# Patient Record
Sex: FEMALE | Race: WHITE | NOT HISPANIC OR LATINO | Employment: UNEMPLOYED | URBAN - NONMETROPOLITAN AREA
[De-identification: names, ages, dates, MRNs, and addresses within clinical notes are randomized per-mention and may not be internally consistent; named-entity substitution may affect disease eponyms.]

---

## 2017-08-29 ENCOUNTER — CONSULT (OUTPATIENT)
Dept: ONCOLOGY | Facility: CLINIC | Age: 37
End: 2017-08-29

## 2017-08-29 VITALS
RESPIRATION RATE: 18 BRPM | OXYGEN SATURATION: 98 % | TEMPERATURE: 98.5 F | SYSTOLIC BLOOD PRESSURE: 110 MMHG | HEIGHT: 61 IN | HEART RATE: 86 BPM | BODY MASS INDEX: 26.36 KG/M2 | WEIGHT: 139.6 LBS | DIASTOLIC BLOOD PRESSURE: 73 MMHG

## 2017-08-29 DIAGNOSIS — D75.1 POLYCYTHEMIA: Primary | ICD-10-CM

## 2017-08-29 LAB
BASOPHILS # BLD AUTO: 0.03 10*3/MM3 (ref 0–0.3)
BASOPHILS NFR BLD AUTO: 0.4 % (ref 0–2)
DEPRECATED RDW RBC AUTO: 48.2 FL (ref 37–54)
EOSINOPHIL # BLD AUTO: 0.11 10*3/MM3 (ref 0–0.7)
EOSINOPHIL NFR BLD AUTO: 1.6 % (ref 0–5)
ERYTHROCYTE [DISTWIDTH] IN BLOOD BY AUTOMATED COUNT: 14.7 % (ref 11.5–14.5)
HCT VFR BLD AUTO: 51.6 % (ref 37–47)
HGB BLD-MCNC: 17.6 G/DL (ref 12–16)
IMM GRANULOCYTES # BLD: 0.02 10*3/MM3 (ref 0–0.03)
IMM GRANULOCYTES NFR BLD: 0.3 % (ref 0–0.5)
LYMPHOCYTES # BLD AUTO: 1.91 10*3/MM3 (ref 1–3)
LYMPHOCYTES NFR BLD AUTO: 28.1 % (ref 21–51)
MCH RBC QN AUTO: 31.2 PG (ref 27–33)
MCHC RBC AUTO-ENTMCNC: 34.1 G/DL (ref 33–37)
MCV RBC AUTO: 91.5 FL (ref 80–94)
MONOCYTES # BLD AUTO: 0.43 10*3/MM3 (ref 0.1–0.9)
MONOCYTES NFR BLD AUTO: 6.3 % (ref 0–10)
NEUTROPHILS # BLD AUTO: 4.3 10*3/MM3 (ref 1.4–6.5)
NEUTROPHILS NFR BLD AUTO: 63.3 % (ref 30–70)
PLATELET # BLD AUTO: 272 10*3/MM3 (ref 130–400)
PMV BLD AUTO: 10 FL (ref 6–10)
RBC # BLD AUTO: 5.64 10*6/MM3 (ref 4.2–5.4)
WBC NRBC COR # BLD: 6.8 10*3/MM3 (ref 4.5–12.5)

## 2017-08-29 PROCEDURE — 81219 CALR GENE COM VARIANTS: CPT | Performed by: NURSE PRACTITIONER

## 2017-08-29 PROCEDURE — 81402 MOPATH PROCEDURE LEVEL 3: CPT | Performed by: NURSE PRACTITIONER

## 2017-08-29 PROCEDURE — 88185 FLOWCYTOMETRY/TC ADD-ON: CPT | Performed by: NURSE PRACTITIONER

## 2017-08-29 PROCEDURE — 85025 COMPLETE CBC W/AUTO DIFF WBC: CPT | Performed by: NURSE PRACTITIONER

## 2017-08-29 PROCEDURE — 99214 OFFICE O/P EST MOD 30 MIN: CPT | Performed by: NURSE PRACTITIONER

## 2017-08-29 PROCEDURE — 88184 FLOWCYTOMETRY/ TC 1 MARKER: CPT | Performed by: NURSE PRACTITIONER

## 2017-08-29 PROCEDURE — 88189 FLOWCYTOMETRY/READ 16 & >: CPT | Performed by: NURSE PRACTITIONER

## 2017-08-29 NOTE — PROGRESS NOTES
DATE:  8/29/2017    DIAGNOSIS: Polycythemia    CHIEF COMPLAINT:  Follow up of Polycythemia    HPI:  Ms. Glasgow is a very pleasant 35 year old  female who current presents in follow up appointment   at the request of ELSA Russell for further evaluation of possible polycythemia.She is unsure as to how long she has had an elevated hemoglobin and hematocrit. She denies of any fevers, night sweats or weight changes. She does complain of headaches that have been attributed to     migraines. She does states that she has early satiety and decreased appetite. Most recently she recently used a cream on her face for prevention of acne and has had erythema of her face along with a new nodule on her forehead that is tender to touch. No tongue swelling. Patient denies of any pruritus. She denies of any chest pain, does complain of shortness of air on exertion. Denies of any abdominal pain or recent changes to bowel movements or urination. No abnormal bleeding. Denies of any pruritus after     showers. She does report of snoring at night, but does not awake in the middle of the night.       Interval History:  Ms. Glasgow presents today for follow up of polycythemia. She was initially evaluated by Dr. Messina in February 2016 and underwent work up at that time. She followed up again in March 2016 and has been noncompliant with follow ups since then. Since her last visit, she reports she has been doing well. She has been following with her PCP with routine blood testing and continues to have chronic elevation in H/H. Initial workup from early last year was negative for JAK2 V167F mutation and LUCIO 2 Exon 12 mutation. Erythropoietin level was also normal. Ordered abdominal US and PFTs (given that she is a chronic smoker) but she did not have these done. CXR from January 2016 revealed no abnormalities. 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 Patient reports she continues to  smoke 2 PPD. She denies any headaches, dizziness or focal weakness. She denies any excessive fatigue. She denies any new medications added recently. She reports chronic SOB on exertion and cough, denies worsening. She reports good appetite and stable weight.  She reports she is anxious because she doesn't want to have cancer.     PAST MEDICAL HISTORY:  Arthritis, Hyperlipidemia, Congenital kidney malformation    PAST SURGICAL HISTORY:  Past Surgical History:   Procedure Laterality Date   • TUBAL ABDOMINAL LIGATION  2001       SOCIAL HISTORY:  Social History     Social History   • Marital status:      Spouse name: N/A   • Number of children: N/A   • Years of education: N/A     Occupational History   • Not on file.     Social History Main Topics   • Smoking status: Current Every Day Smoker     Packs/day: 2.00   • Smokeless tobacco: Not on file   • Alcohol use No   • Drug use: No   • Sexual activity: Defer     Other Topics Concern   • Not on file     Social History Narrative   • No narrative on file     FAMILY HISTORY:  History reviewed. No pertinent family history.    MEDICATIONS:    No current outpatient prescriptions on file.    ALLERGIES:  PCN, Latex     REVIEW OF SYSTEMS:    A comprehensive 14 point review of systems was performed.  Significant findings as mentioned above.  All other systems reviewed and are negative.      Physical Exam   Vital Signs:   Vitals:    08/29/17 1024   BP: 110/73   Pulse: 86   Resp: 18   Temp: 98.5 °F (36.9 °C)   SpO2: 98%     General: Well developed, well nourished, alert and oriented x 3, in no acute distress. Appears anxious  Head: ATNC   Eyes: PERRL, No evidence of conjunctivitis.   Nose: No nasal discharge.   Mouth: Oral mucosal membranes moist. No oral ulceration or hemorrhages.   Neck: Neck supple. No thyromegaly. No JVD.   Lungs: Scattered wheezing bilaterally, no rales or rhonchi.    Heart: S1, S2. Regular rate and rhythm. No murmurs, rubs, or gallops.   Abdomen: Soft.  Bowel sounds are normoactive. Nontender with palpation. No Hepatosplenomegaly can be appreciated.   Extremities: No cyanosis or edema. Peripheral pulses palpable and equal bilaterally.   Integumentary: No rash, sores, or erythema. No blistering, bruising, or dry skin.   Hem/Lymph Nodes: No palpable cervical, submandibular, supraclavicular or axillary lymphadenopathy noted. No petechiae, purpura or ecchymosis noted.   Neurologic: Alert, awake and oriented x 3. Grossly non-focal exam. No sensory deficit.     IMAGING:  CXR 1/2/17  The included view of the chest demonstrates the lungs to be well  aerated. There is no focal alveolar infiltrate or pleural effusion. The  cardiac silhouette is normal. No acute osseous abnormality is seen  within the chest.    RECENT LABS:  Lab Results   Component Value Date    WBC 6.80 08/29/2017    HGB 17.6 (H) 08/29/2017    HCT 51.6 (H) 08/29/2017    MCV 91.5 08/29/2017    RDW 14.7 (H) 08/29/2017     08/29/2017    NEUTRORELPCT 63.3 08/29/2017    LYMPHORELPCT 28.1 08/29/2017    MONORELPCT 6.3 08/29/2017    EOSRELPCT 1.6 08/29/2017    BASORELPCT 0.4 08/29/2017    NEUTROABS 4.30 08/29/2017    LYMPHSABS 1.91 08/29/2017       Lab Results   Component Value Date     02/05/2016    K 3.9 02/05/2016    CO2 28.5 02/05/2016     02/05/2016    BUN 9 02/05/2016    CREATININE 0.96 02/05/2016    GLUCOSE 94 02/05/2016    CALCIUM 9.1 02/05/2016    ALKPHOS 97 02/05/2016    AST 22 02/05/2016    ALT 22 02/05/2016    BILITOT 0.9 02/05/2016    ALBUMIN 4.3 02/05/2016    PROTEINTOT 7.4 02/05/2016 02/03/16: Erythropoietin: 8.1                        JAK2 V617F mutation: negative                        CBC: WBC 9.9, Hb 16.3, Hct 49.1, MCV 94, Plt 287, ANC 6.4, ALC 2.4, AMC 0.8                        CMP: BUN 14, Cr 0.94, Ca 9.3, AST 17, ALT 22, AP 90, TBil 0.5, TPro 7.6, Alb 4.6      02/17/16: CBC: WBC 7.4, Hb 16, Hct 46.6, MCV 94, Plt 269, ANC 4, ALC 2.5, AMC 0.6                        JAK2  Exon 12   mutation: negative    8/4/17: CBC: WBC 6.7, Hg 17, Hct 48.9, Plt 292    ASSESSMENT & PLAN:  Loida Glasgow is a very pleasant 37 y.o. female with    1.  Polycythemia, likely secondary:  -Patient was initially evaluated by Dr. Messina in February 2016 and underwent work up at that time. She followed up again in March 2016 and has been noncompliant with follow ups since then. Patient has been following with her PCP with routine blood testing and continues to have chronic elevation in H/H with stable counts.  -Initial workup from early last year was negative for JAK2 V167F mutation and LUCIO 2 Exon 12 mutation. Erythropoietin level was also normal.   -Ordered abdominal US and PFTs (given that she is a chronic smoker) but she did not have these done.   -CXR revealed no abnormalities in January 2016.   -D/w patient that based on initial workup from early last year and repeat CBCs, her polycythemia is likely secondary polycythemia secondary to smoking. However, we need to have additional labs today for further evaluation since she has been noncompliant with follow up.  - Will have MPL, CALR mutation, BCR/ABL by PCR and repeat CBCD today.  -Will order sleep study to evaluate for FROILAN.  Will also order PFTs and abdominal US today (these were previously ordered and not completed). Will have patient return to clinic in 2 weeks with repeat CBCD to review results and discuss further management. In the meantime, strongly advised patient to quit smoking.       I spent 25 minutes with Loida Glasgow today. More than 50% of the time was spent in counseling / coordination of care for the above problems.      Electronically Signed by: ELSA Maki ,  August 29, 2017 12:00 PM       CC:   No ref. provider found  ELSA Russell

## 2017-08-31 LAB — LEUK/LYMPH PHENO: NORMAL

## 2017-09-05 LAB
CALR EXON 9 MUT ANL BLD/T: NORMAL
LAB DIRECTOR NAME PROVIDER: NORMAL
Lab: NORMAL
REF LAB TEST METHOD: NORMAL
SERVICE CMNT-IMP: NORMAL

## 2017-09-06 LAB
LAB DIRECTOR NAME PROVIDER: NORMAL
MPL MUTATION ANALYSIS RESULT:: NORMAL
REF LAB TEST METHOD: NORMAL
REFERENCE: NORMAL
SERVICE CMNT-IMP: NORMAL

## 2017-09-15 ENCOUNTER — HOSPITAL ENCOUNTER (OUTPATIENT)
Dept: ULTRASOUND IMAGING | Facility: HOSPITAL | Age: 37
Discharge: HOME | End: 2017-09-15
Admitting: NURSE PRACTITIONER

## 2017-09-15 DIAGNOSIS — D75.1 POLYCYTHEMIA: ICD-10-CM

## 2017-09-15 PROCEDURE — 76700 US EXAM ABDOM COMPLETE: CPT

## 2017-09-15 PROCEDURE — 76700 US EXAM ABDOM COMPLETE: CPT | Performed by: RADIOLOGY

## 2017-09-20 ENCOUNTER — OFFICE VISIT (OUTPATIENT)
Dept: ONCOLOGY | Facility: CLINIC | Age: 37
End: 2017-09-20

## 2017-09-20 VITALS
RESPIRATION RATE: 18 BRPM | WEIGHT: 141.7 LBS | TEMPERATURE: 98.2 F | DIASTOLIC BLOOD PRESSURE: 71 MMHG | SYSTOLIC BLOOD PRESSURE: 105 MMHG | HEART RATE: 96 BPM | BODY MASS INDEX: 26.77 KG/M2 | OXYGEN SATURATION: 98 %

## 2017-09-20 DIAGNOSIS — D75.1 POLYCYTHEMIA: ICD-10-CM

## 2017-09-20 DIAGNOSIS — Z72.0 TOBACCO USE: ICD-10-CM

## 2017-09-20 LAB
BASOPHILS # BLD AUTO: 0.05 10*3/MM3 (ref 0–0.3)
BASOPHILS NFR BLD AUTO: 0.6 % (ref 0–2)
DEPRECATED RDW RBC AUTO: 47.8 FL (ref 37–54)
EOSINOPHIL # BLD AUTO: 0.2 10*3/MM3 (ref 0–0.7)
EOSINOPHIL NFR BLD AUTO: 2.2 % (ref 0–5)
ERYTHROCYTE [DISTWIDTH] IN BLOOD BY AUTOMATED COUNT: 14.6 % (ref 11.5–14.5)
HCT VFR BLD AUTO: 48.5 % (ref 37–47)
HGB BLD-MCNC: 16.5 G/DL (ref 12–16)
IMM GRANULOCYTES # BLD: 0.04 10*3/MM3 (ref 0–0.03)
IMM GRANULOCYTES NFR BLD: 0.4 % (ref 0–0.5)
LYMPHOCYTES # BLD AUTO: 3.02 10*3/MM3 (ref 1–3)
LYMPHOCYTES NFR BLD AUTO: 33.6 % (ref 21–51)
MCH RBC QN AUTO: 31.4 PG (ref 27–33)
MCHC RBC AUTO-ENTMCNC: 34 G/DL (ref 33–37)
MCV RBC AUTO: 92.4 FL (ref 80–94)
MONOCYTES # BLD AUTO: 0.64 10*3/MM3 (ref 0.1–0.9)
MONOCYTES NFR BLD AUTO: 7.1 % (ref 0–10)
NEUTROPHILS # BLD AUTO: 5.04 10*3/MM3 (ref 1.4–6.5)
NEUTROPHILS NFR BLD AUTO: 56.1 % (ref 30–70)
PLATELET # BLD AUTO: 339 10*3/MM3 (ref 130–400)
PMV BLD AUTO: 9.3 FL (ref 6–10)
RBC # BLD AUTO: 5.25 10*6/MM3 (ref 4.2–5.4)
WBC NRBC COR # BLD: 8.99 10*3/MM3 (ref 4.5–12.5)

## 2017-09-20 PROCEDURE — 85025 COMPLETE CBC W/AUTO DIFF WBC: CPT | Performed by: NURSE PRACTITIONER

## 2017-09-20 PROCEDURE — 99214 OFFICE O/P EST MOD 30 MIN: CPT | Performed by: NURSE PRACTITIONER

## 2017-09-20 RX ORDER — SIMVASTATIN 20 MG
20 TABLET ORAL NIGHTLY
COMMUNITY

## 2017-09-20 NOTE — PROGRESS NOTES
DATE:  9/20/2017    DIAGNOSIS: Polycythemia    CHIEF COMPLAINT:  Follow up of Polycythemia    HPI:  Ms. Glasgow presents today for follow up of polycythemia. She was initially evaluated by Dr. Messina in February 2016 and underwent work up at that time. She followed up again in March 2016 and has been noncompliant with follow ups since then. Since her last visit, she reports she has been doing well. She has been following with her PCP with routine blood testing and continues to have chronic elevation in H/H. Initial workup from early last year was negative for JAK2 V167F mutation and LUCIO 2 Exon 12 mutation. Erythropoietin level was also normal. Ordered abdominal US and PFTs (given that she is a chronic smoker) but she did not have these done. CXR from January 2016 revealed no abnormalities. 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 Patient reports she continues to smoke 2 PPD. She denies any headaches, dizziness or focal weakness. She denies any excessive fatigue. She denies any new medications added recently. She reports chronic SOB on exertion and cough, denies worsening. She reports good appetite and stable weight.  She reports she is anxious because she doesn't want to have cancer.     Interval History:  Ms. Glasgow presents today for follow up of polycythemia. Since her last OV, she reports she has been doing well. She continues to remain anxious and does not want to have cancer. She continues to smoke 2 PPD. She reports chronic SOB on exertion and cough. She denies any headaches, dizziness or focal weakness. She denies any other specific complaints today. She reports she went and tried to have PFTs done, but she couldn't get this completed because she was anxious.     PAST MEDICAL HISTORY:  Arthritis, Hyperlipidemia, Congenital kidney malformation    PAST SURGICAL HISTORY:  Past Surgical History:   Procedure Laterality Date   • TUBAL ABDOMINAL LIGATION  2001       SOCIAL  HISTORY:  Social History     Social History   • Marital status:      Spouse name: N/A   • Number of children: N/A   • Years of education: N/A     Occupational History   • Not on file.     Social History Main Topics   • Smoking status: Current Every Day Smoker     Packs/day: 2.00   • Smokeless tobacco: Never Used   • Alcohol use No   • Drug use: No   • Sexual activity: Defer     Other Topics Concern   • Not on file     Social History Narrative     FAMILY HISTORY:  History reviewed. No pertinent family history.    MEDICATIONS:      Current Outpatient Prescriptions:   •  simvastatin (ZOCOR) 20 MG tablet, Take 20 mg by mouth Every Night., Disp: , Rfl:     ALLERGIES:  PCN, Latex     REVIEW OF SYSTEMS:    A comprehensive 14 point review of systems was performed.  Significant findings as mentioned above.  All other systems reviewed and are negative.      Physical Exam   Vital Signs:   Vitals:    09/20/17 1022   BP: 105/71   Pulse: 96   Resp: 18   Temp: 98.2 °F (36.8 °C)   SpO2: 98%     General: Well developed, well nourished, alert and oriented x 3, in no acute distress. Appears anxious  Head: ATNC   Eyes: PERRL, No evidence of conjunctivitis.   Nose: No nasal discharge.   Mouth: Oral mucosal membranes moist. No oral ulceration or hemorrhages.   Neck: Neck supple. No thyromegaly. No JVD.   Lungs: Scattered wheezing bilaterally, no rales or rhonchi.    Heart: S1, S2. Regular rate and rhythm. No murmurs, rubs, or gallops.   Abdomen: Soft. Bowel sounds are normoactive. Nontender with palpation. No Hepatosplenomegaly can be appreciated.   Extremities: No cyanosis or edema. Peripheral pulses palpable and equal bilaterally.   Integumentary: No rash, sores, or erythema. No blistering, bruising, or dry skin.   Hem/Lymph Nodes: No palpable cervical, submandibular, supraclavicular or axillary lymphadenopathy noted. No petechiae, purpura or ecchymosis noted.   Neurologic: Alert, awake and oriented x 3. Grossly non-focal exam.  No sensory deficit.     IMAGING:  CXR 1/2/17  The included view of the chest demonstrates the lungs to be well  aerated. There is no focal alveolar infiltrate or pleural effusion. The  cardiac silhouette is normal. No acute osseous abnormality is seen  within the chest.      RECENT LABS:  Lab Results   Component Value Date    WBC 8.99 09/20/2017    HGB 16.5 (H) 09/20/2017    HCT 48.5 (H) 09/20/2017    MCV 92.4 09/20/2017    RDW 14.6 (H) 09/20/2017     09/20/2017    NEUTRORELPCT 56.1 09/20/2017    LYMPHORELPCT 33.6 09/20/2017    MONORELPCT 7.1 09/20/2017    EOSRELPCT 2.2 09/20/2017    BASORELPCT 0.6 09/20/2017    NEUTROABS 5.04 09/20/2017    LYMPHSABS 3.02 (H) 09/20/2017       Lab Results   Component Value Date     02/05/2016    K 3.9 02/05/2016    CO2 28.5 02/05/2016     02/05/2016    BUN 9 02/05/2016    CREATININE 0.96 02/05/2016    GLUCOSE 94 02/05/2016    CALCIUM 9.1 02/05/2016    ALKPHOS 97 02/05/2016    AST 22 02/05/2016    ALT 22 02/05/2016    BILITOT 0.9 02/05/2016    ALBUMIN 4.3 02/05/2016    PROTEINTOT 7.4 02/05/2016 02/03/16: Erythropoietin: 8.1                        JAK2 V617F mutation: negative                        CBC: WBC 9.9, Hb 16.3, Hct 49.1, MCV 94, Plt 287, ANC 6.4, ALC 2.4, AMC 0.8                        CMP: BUN 14, Cr 0.94, Ca 9.3, AST 17, ALT 22, AP 90, TBil 0.5, TPro 7.6, Alb 4.6      02/17/16: CBC: WBC 7.4, Hb 16, Hct 46.6, MCV 94, Plt 269, ANC 4, ALC 2.5, AMC 0.6                        JAK2 Exon 12   mutation: negative    8/4/17: CBC: WBC 6.7, Hg 17, Hct 48.9, Plt 292  MPL Mutation analysis: Negative  BCR/ABL by PCR: Negative  CALR mutation: Negative    ASSESSMENT & PLAN:  Loida Glasgow is a very pleasant 37 y.o. female with    1.  Polycythemia, likely secondary:  -Patient was initially evaluated by Dr. Messina in February 2016 and underwent work up at that time. She followed up again in March 2016 and has been noncompliant with follow ups since then. Patient  has been following with her PCP with routine blood testing and continues to have chronic elevation in H/H with stable counts.  -Initial workup from early last year was negative for JAK2 V167F mutation and LUCIO 2 Exon 12 mutation. Erythropoietin level was also normal.   -Ordered abdominal US which revealed no splenomegaly and PFTs (given that she is a chronic smoker) which she reports she could not complete due to anxiety.   -CXR revealed no abnormalities in January 2016.   - We had additional workup including MPL and CALR mutation analysis which were negative. BCR/ABL by PCR also negative.    -Repeat CBCD today continues to show elevation in H/H, but slightly improved from last presentation.   -D/w patient that based workup and repeat CBCs, her polycythemia is likely secondary polycythemia secondary to chronic smoking. Strongly advised patient to quit smoking again today. Also discussed with patient that she would likely benefit from a pulmonary evaluation but was not interested at OV today and says she will discuss this further with her PCP in follow up.   -Will have patient return to clinic in 3 months with repeat CBC and CMP. If there is any further increase in H/H or if patient becomes symptomatic, will likely consider phlebotomy at that time. However, if counts remain stable, will have her follow up in 6 months.     The patient was in agreement with the plan.     I spent 25 minutes with Loida Glasgow today. More than 50% of the time was spent in counseling / coordination of care for the above problems.      Electronically Signed by: ELSA Maki ,  September 20, 2017 3:03 PM       CC:   No ref. provider found  ELSA Russell

## 2017-12-28 ENCOUNTER — LAB (OUTPATIENT)
Dept: ONCOLOGY | Facility: CLINIC | Age: 37
End: 2017-12-28

## 2017-12-28 ENCOUNTER — OFFICE VISIT (OUTPATIENT)
Dept: ONCOLOGY | Facility: CLINIC | Age: 37
End: 2017-12-28

## 2017-12-28 VITALS
DIASTOLIC BLOOD PRESSURE: 82 MMHG | SYSTOLIC BLOOD PRESSURE: 112 MMHG | TEMPERATURE: 98.1 F | BODY MASS INDEX: 27.42 KG/M2 | RESPIRATION RATE: 18 BRPM | WEIGHT: 145.1 LBS | HEART RATE: 86 BPM | OXYGEN SATURATION: 98 %

## 2017-12-28 DIAGNOSIS — D75.1 POLYCYTHEMIA: ICD-10-CM

## 2017-12-28 LAB
ALBUMIN SERPL-MCNC: 4.4 G/DL (ref 3.5–5)
ALBUMIN/GLOB SERPL: 1.7 G/DL (ref 1.5–2.5)
ALP SERPL-CCNC: 69 U/L (ref 35–104)
ALT SERPL W P-5'-P-CCNC: 28 U/L (ref 10–36)
ANION GAP SERPL CALCULATED.3IONS-SCNC: 7.1 MMOL/L (ref 3.6–11.2)
AST SERPL-CCNC: 23 U/L (ref 10–30)
BASOPHILS # BLD AUTO: 0.03 10*3/MM3 (ref 0–0.3)
BASOPHILS NFR BLD AUTO: 0.4 % (ref 0–2)
BILIRUB SERPL-MCNC: 0.6 MG/DL (ref 0.2–1.8)
BUN BLD-MCNC: 10 MG/DL (ref 7–21)
BUN/CREAT SERPL: 11.1 (ref 7–25)
CALCIUM SPEC-SCNC: 9.1 MG/DL (ref 7.7–10)
CHLORIDE SERPL-SCNC: 109 MMOL/L (ref 99–112)
CO2 SERPL-SCNC: 25.9 MMOL/L (ref 24.3–31.9)
CREAT BLD-MCNC: 0.9 MG/DL (ref 0.43–1.29)
DEPRECATED RDW RBC AUTO: 50.2 FL (ref 37–54)
EOSINOPHIL # BLD AUTO: 0.18 10*3/MM3 (ref 0–0.7)
EOSINOPHIL NFR BLD AUTO: 2.7 % (ref 0–5)
ERYTHROCYTE [DISTWIDTH] IN BLOOD BY AUTOMATED COUNT: 14.8 % (ref 11.5–14.5)
GFR SERPL CREATININE-BSD FRML MDRD: 70 ML/MIN/1.73
GLOBULIN UR ELPH-MCNC: 2.6 GM/DL
GLUCOSE BLD-MCNC: 83 MG/DL (ref 70–110)
HCT VFR BLD AUTO: 48.7 % (ref 37–47)
HGB BLD-MCNC: 16.6 G/DL (ref 12–16)
IMM GRANULOCYTES # BLD: 0.01 10*3/MM3 (ref 0–0.03)
IMM GRANULOCYTES NFR BLD: 0.1 % (ref 0–0.5)
LYMPHOCYTES # BLD AUTO: 1.83 10*3/MM3 (ref 1–3)
LYMPHOCYTES NFR BLD AUTO: 27 % (ref 21–51)
MCH RBC QN AUTO: 31.9 PG (ref 27–33)
MCHC RBC AUTO-ENTMCNC: 34.1 G/DL (ref 33–37)
MCV RBC AUTO: 93.7 FL (ref 80–94)
MONOCYTES # BLD AUTO: 0.53 10*3/MM3 (ref 0.1–0.9)
MONOCYTES NFR BLD AUTO: 7.8 % (ref 0–10)
NEUTROPHILS # BLD AUTO: 4.21 10*3/MM3 (ref 1.4–6.5)
NEUTROPHILS NFR BLD AUTO: 62 % (ref 30–70)
OSMOLALITY SERPL CALC.SUM OF ELEC: 281.3 MOSM/KG (ref 273–305)
PLATELET # BLD AUTO: 248 10*3/MM3 (ref 130–400)
PMV BLD AUTO: 10.4 FL (ref 6–10)
POTASSIUM BLD-SCNC: 3.8 MMOL/L (ref 3.5–5.3)
PROT SERPL-MCNC: 7 G/DL (ref 6–8)
RBC # BLD AUTO: 5.2 10*6/MM3 (ref 4.2–5.4)
SODIUM BLD-SCNC: 142 MMOL/L (ref 135–153)
WBC NRBC COR # BLD: 6.79 10*3/MM3 (ref 4.5–12.5)

## 2017-12-28 PROCEDURE — 85025 COMPLETE CBC W/AUTO DIFF WBC: CPT | Performed by: NURSE PRACTITIONER

## 2017-12-28 PROCEDURE — 80053 COMPREHEN METABOLIC PANEL: CPT | Performed by: NURSE PRACTITIONER

## 2017-12-28 PROCEDURE — 99214 OFFICE O/P EST MOD 30 MIN: CPT | Performed by: NURSE PRACTITIONER

## 2017-12-28 NOTE — PROGRESS NOTES
"DATE:  12/28/2017    DIAGNOSIS: Polycythemia    CHIEF COMPLAINT:  Follow up of Polycythemia    HPI:  Ms. Glasgow presents today for follow up of polycythemia. She was initially evaluated by Dr. Messina in February 2016 and underwent work up at that time. She followed up again in March 2016 and has been noncompliant with follow ups since then. Since her last visit, she reports she has been doing well. She has been following with her PCP with routine blood testing and continues to have chronic elevation in H/H. Initial workup from early last year was negative for JAK2 V167F mutation and LUCIO 2 Exon 12 mutation. Erythropoietin level was also normal. Ordered abdominal US and PFTs (given that she is a chronic smoker) but she did not have these done. CXR from January 2016 revealed no abnormalities. 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 Patient reports she continues to smoke 2 PPD. She denies any headaches, dizziness or focal weakness. She denies any excessive fatigue. She denies any new medications added recently. She reports chronic SOB on exertion and cough, denies worsening. She reports good appetite and stable weight.  She reports she is anxious because she doesn't want to have cancer.     Interval History:  Ms. Glasgow presents today for follow up of polycythemia. Since her last OV, she reports she has been doing well. She says she continues to smoke but has been \"slowing down.\" Previously, she was smoking 2 PPD and she is now smoking 8-9 cigarettes/day. She reports improvement in SOB and cough since her last visit. She continues to deny any headaches, dizziness or focal weakness.  Since her SOB has improved, she does not want to see a pulmonologist.     PAST MEDICAL HISTORY:  Arthritis, Hyperlipidemia, Congenital kidney malformation    PAST SURGICAL HISTORY:  Past Surgical History:   Procedure Laterality Date   • TUBAL ABDOMINAL LIGATION  2001       SOCIAL HISTORY:  Social " History     Social History   • Marital status:      Spouse name: N/A   • Number of children: N/A   • Years of education: N/A     Occupational History   • Not on file.     Social History Main Topics   • Smoking status: Current Every Day Smoker     Packs/day: 2.00   • Smokeless tobacco: Never Used   • Alcohol use No   • Drug use: No   • Sexual activity: Defer     Other Topics Concern   • Not on file     Social History Narrative     FAMILY HISTORY:  No family history on file.    MEDICATIONS:      Current Outpatient Prescriptions:   •  simvastatin (ZOCOR) 20 MG tablet, Take 20 mg by mouth Every Night., Disp: , Rfl:     ALLERGIES:  PCN, Latex     REVIEW OF SYSTEMS:    A comprehensive 14 point review of systems was performed.  Significant findings as mentioned above.  All other systems reviewed and are negative.      Physical Exam   Vital Signs:   Vitals:    12/28/17 1254   BP: 112/82   Pulse: 86   Resp: 18   Temp: 98.1 °F (36.7 °C)   SpO2: 98%     General: Well developed, well nourished, alert and oriented x 3, in no acute distress. Always appears anxious  Head: ATNC   Eyes: PERRL, No evidence of conjunctivitis.   Nose: No nasal discharge.   Mouth: Oral mucosal membranes moist. No oral ulceration or hemorrhages.   Neck: Neck supple. No thyromegaly. No JVD.   Lungs: Scattered wheezing bilaterally, no rales or rhonchi.    Heart: S1, S2. Regular rate and rhythm. No murmurs, rubs, or gallops.   Abdomen: Soft. Bowel sounds are normoactive. Nontender with palpation. No Hepatosplenomegaly can be appreciated.   Extremities: No cyanosis or edema. Peripheral pulses palpable and equal bilaterally.   Integumentary: No rash, sores, or erythema. No blistering, bruising, or dry skin.   Neurologic: Alert, awake and oriented x 3. Grossly non-focal exam. No sensory deficit.     IMAGING:  CXR 1/2/17  The included view of the chest demonstrates the lungs to be well  aerated. There is no focal alveolar infiltrate or pleural effusion.  The  cardiac silhouette is normal. No acute osseous abnormality is seen  within the chest.      RECENT LABS:  Lab Results   Component Value Date    WBC 6.79 12/28/2017    HGB 16.6 (H) 12/28/2017    HCT 48.7 (H) 12/28/2017    MCV 93.7 12/28/2017    RDW 14.8 (H) 12/28/2017     12/28/2017    NEUTRORELPCT 62.0 12/28/2017    LYMPHORELPCT 27.0 12/28/2017    MONORELPCT 7.8 12/28/2017    EOSRELPCT 2.7 12/28/2017    BASORELPCT 0.4 12/28/2017    NEUTROABS 4.21 12/28/2017    LYMPHSABS 1.83 12/28/2017       Lab Results   Component Value Date     12/28/2017    K 3.8 12/28/2017    CO2 25.9 12/28/2017     12/28/2017    BUN 10 12/28/2017    CREATININE 0.90 12/28/2017    EGFRIFNONA 70 12/28/2017    GLUCOSE 83 12/28/2017    CALCIUM 9.1 12/28/2017    ALKPHOS 69 12/28/2017    AST 23 12/28/2017    ALT 28 12/28/2017    BILITOT 0.6 12/28/2017    ALBUMIN 4.40 12/28/2017    PROTEINTOT 7.0 12/28/2017 02/03/16: Erythropoietin: 8.1                        JAK2 V617F mutation: negative                        CBC: WBC 9.9, Hb 16.3, Hct 49.1, MCV 94, Plt 287, ANC 6.4, ALC 2.4, AMC 0.8                        CMP: BUN 14, Cr 0.94, Ca 9.3, AST 17, ALT 22, AP 90, TBil 0.5, TPro 7.6, Alb 4.6      02/17/16: CBC: WBC 7.4, Hb 16, Hct 46.6, MCV 94, Plt 269, ANC 4, ALC 2.5, AMC 0.6                        JAK2 Exon 12   mutation: negative    8/4/17: CBC: WBC 6.7, Hg 17, Hct 48.9, Plt 292  MPL Mutation analysis: Negative  BCR/ABL by PCR: Negative  CALR mutation: Negative    ASSESSMENT & PLAN:  Lodia Glasgow is a very pleasant 37 y.o. female with    1.  Polycythemia, likely secondary, stable:   -Patient was initially evaluated by Dr. Messina in February 2016 and underwent work up at that time. She followed up again in March 2016 and has been noncompliant with follow up since then. Patient has been following with her PCP with routine blood testing and continues to have chronic elevation in H/H with stable counts.  -Initial workup  from early last year was negative for JAK2 V167F mutation and LUCIO 2 Exon 12 mutation. Erythropoietin level was also normal.   -Ordered abdominal US which revealed no splenomegaly and PFTs (given that she is a chronic smoker) which she reports she could not complete due to anxiety.   -CXR revealed no abnormalities in January 2016.   - We had additional workup including MPL and CALR mutation analysis which were negative. BCR/ABL by PCR also negative.    -Repeat CBC today showed Hg 16.6, 48.7 which remains stable from last presentation.  -Previously discussed and again today with patient that based workup and repeat CBCs, her polycythemia is likely secondary polycythemia secondary to chronic smoking. Strongly advised patient to quit smoking and she says she has been cutting back, now only smoking 8-9 cigarettes/day. Patient knows she would likely benefit from a pulmonary evaluation but remains uninterested at this time and says her SOB has improved.   -Will have patient return to clinic in 6 months with repeat CBC and CMP. If there is any further increase in H/H or if patient becomes symptomatic, will likely consider phlebotomy at that time.   The patient was in agreement with the plan.     I spent 25 minutes with Loida Glasgow today. More than 50% of the time was spent in counseling / coordination of care for the above problems.      Electronically Signed by: ELSA Maki ,  December 28, 2017 12:27 PM       CC:   No ref. provider found  ELSA Russell

## 2021-01-26 ENCOUNTER — DOCUMENTATION (OUTPATIENT)
Dept: ONCOLOGY | Facility: HOSPITAL | Age: 41
End: 2021-01-26

## 2021-01-26 ENCOUNTER — CONSULT (OUTPATIENT)
Dept: ONCOLOGY | Facility: CLINIC | Age: 41
End: 2021-01-26

## 2021-01-26 ENCOUNTER — HOSPITAL ENCOUNTER (OUTPATIENT)
Dept: GENERAL RADIOLOGY | Facility: HOSPITAL | Age: 41
Discharge: HOME | End: 2021-01-26
Admitting: NURSE PRACTITIONER

## 2021-01-26 VITALS
SYSTOLIC BLOOD PRESSURE: 128 MMHG | HEART RATE: 94 BPM | TEMPERATURE: 98.4 F | DIASTOLIC BLOOD PRESSURE: 84 MMHG | WEIGHT: 165 LBS | OXYGEN SATURATION: 97 % | BODY MASS INDEX: 31.18 KG/M2 | RESPIRATION RATE: 18 BRPM

## 2021-01-26 DIAGNOSIS — D75.1 POLYCYTHEMIA: ICD-10-CM

## 2021-01-26 DIAGNOSIS — D75.1 POLYCYTHEMIA: Primary | ICD-10-CM

## 2021-01-26 LAB
BASOPHILS # BLD AUTO: 0.09 10*3/MM3 (ref 0–0.2)
BASOPHILS NFR BLD AUTO: 1.2 % (ref 0–1.5)
DEPRECATED RDW RBC AUTO: 52.9 FL (ref 37–54)
EOSINOPHIL # BLD AUTO: 0.17 10*3/MM3 (ref 0–0.4)
EOSINOPHIL NFR BLD AUTO: 2.2 % (ref 0.3–6.2)
ERYTHROCYTE [DISTWIDTH] IN BLOOD BY AUTOMATED COUNT: 15.1 % (ref 12.3–15.4)
HCT VFR BLD AUTO: 52.1 % (ref 34–46.6)
HGB BLD-MCNC: 17.1 G/DL (ref 12–15.9)
IMM GRANULOCYTES # BLD AUTO: 0.02 10*3/MM3 (ref 0–0.05)
IMM GRANULOCYTES NFR BLD AUTO: 0.3 % (ref 0–0.5)
LYMPHOCYTES # BLD AUTO: 2.13 10*3/MM3 (ref 0.7–3.1)
LYMPHOCYTES NFR BLD AUTO: 27.7 % (ref 19.6–45.3)
Lab: NORMAL
MCH RBC QN AUTO: 31.3 PG (ref 26.6–33)
MCHC RBC AUTO-ENTMCNC: 32.8 G/DL (ref 31.5–35.7)
MCV RBC AUTO: 95.4 FL (ref 79–97)
MONOCYTES # BLD AUTO: 0.5 10*3/MM3 (ref 0.1–0.9)
MONOCYTES NFR BLD AUTO: 6.5 % (ref 5–12)
NEUTROPHILS NFR BLD AUTO: 4.77 10*3/MM3 (ref 1.7–7)
NEUTROPHILS NFR BLD AUTO: 62.1 % (ref 42.7–76)
NRBC BLD AUTO-RTO: 0 /100 WBC (ref 0–0.2)
PLATELET # BLD AUTO: 404 10*3/MM3 (ref 140–450)
PMV BLD AUTO: 9.2 FL (ref 6–12)
POST-BLOOD PRESSURE: NORMAL
PRE-BLOOD PRESSURE: NORMAL
PRE-HCT: 52.1
PRE-HGB: 17.1
PULSE: 94
RBC # BLD AUTO: 5.46 10*6/MM3 (ref 3.77–5.28)
VOLUME COLLECTED: 500
WBC # BLD AUTO: 7.68 10*3/MM3 (ref 3.4–10.8)

## 2021-01-26 PROCEDURE — 99204 OFFICE O/P NEW MOD 45 MIN: CPT | Performed by: NURSE PRACTITIONER

## 2021-01-26 PROCEDURE — 71046 X-RAY EXAM CHEST 2 VIEWS: CPT

## 2021-01-26 PROCEDURE — 82668 ASSAY OF ERYTHROPOIETIN: CPT | Performed by: NURSE PRACTITIONER

## 2021-01-26 PROCEDURE — 71046 X-RAY EXAM CHEST 2 VIEWS: CPT | Performed by: RADIOLOGY

## 2021-01-26 PROCEDURE — 36415 COLL VENOUS BLD VENIPUNCTURE: CPT | Performed by: NURSE PRACTITIONER

## 2021-01-26 PROCEDURE — 99195 PHLEBOTOMY: CPT | Performed by: NURSE PRACTITIONER

## 2021-01-26 PROCEDURE — 85025 COMPLETE CBC W/AUTO DIFF WBC: CPT | Performed by: NURSE PRACTITIONER

## 2021-01-26 NOTE — PROGRESS NOTES
"DATE OF CONSULTATION:  1/26/2021    REASON FOR REFERRAL: Polycythemia    REFERRING PHYSICIAN:  ELSA Russell    CHIEF COMPLAINT:  Occasional dry cough and headaches    HISTORY OF PRESENT ILLNESS:   Loida Glasgow is a  40 y.o. female , known to us, who is being seen today at the request of ELSA Russell for evaluation and treatment of polycythemia. Ms. Glasgow has been evaluated by Dr. Messina and myself in the past for the above issue. However, she has been noncompliant with follow up and was last seen in December 2017. Previous workup in 2016 and 2017 was  negative for JAK2 V167F mutation and LUCIO 2 Exon 12 mutation. Erythropoietin level was also normal. Abdominal US revealed no splenomegaly. Subsequently checked MPL and CALR mutation analysis which were negative. BCR/ABL by PCR also negative. Therefore, workup was suggestive of secondary polycythemia (due to chronic heavy smoking).PFTs were ordered  (given that she is a chronic smoker) which she reports she could not complete due to anxiety. CXR revealed no abnormalities in January 2016. Recommended formal sleep study but she declined. Since her last visit, she has continued to smoking ~1-2 PPD which she says is better. \"I used to smoke like a [employer] train.\" She has continued to follow with her PCP routinely with blood testing and Hct has remained elevated, ranging ~48-51%. At present, overall, she reports feeling well. She complains of occasional dry cough and headaches. Denies any focal weakness or dizziness. She says she is a very anxious person. She denies any other complaints today.     PAST MEDICAL HISTORY:  History reviewed. No pertinent past medical history.    PAST SURGICAL HISTORY:  Past Surgical History:   Procedure Laterality Date   • TUBAL ABDOMINAL LIGATION  2001     FAMILY HISTORY:  History reviewed. No pertinent family history.    SOCIAL HISTORY:  Social History     Socioeconomic History   • Marital status: Single     Spouse " name: Not on file   • Number of children: Not on file   • Years of education: Not on file   • Highest education level: Not on file   Tobacco Use   • Smoking status: Current Every Day Smoker     Packs/day: 2.00     Years: 17.00     Pack years: 34.00     Types: Cigarettes   • Smokeless tobacco: Never Used   Substance and Sexual Activity   • Alcohol use: No   • Drug use: No   • Sexual activity: Defer     MEDICATIONS:  The current medication list was reviewed in the EMR    Current Outpatient Medications:   •  simvastatin (ZOCOR) 20 MG tablet, Take 20 mg by mouth Every Night., Disp: , Rfl:     ALLERGIES:    Allergies   Allergen Reactions   • Amoxicillin        REVIEW OF SYSTEMS:    A comprehensive 14 point review of systems was performed.  Significant findings as mentioned above.  All other systems reviewed and are negative.      Physical Exam   Vital Signs: /84   Pulse 94   Temp 98.4 °F (36.9 °C) (Temporal)   Resp 18   Wt 74.8 kg (165 lb)   SpO2 97%   BMI 31.18 kg/m²    General: Well developed, well nourished, alert and oriented x 3, in no acute distress. Smells of tobacco.    Head: ATNC   Eyes: PERRL, No evidence of conjunctivitis.   Nose: No nasal discharge.   Mouth: Oral mucosal membranes moist. No oral ulceration or hemorrhages.   Neck: Neck supple. No thyromegaly. No JVD.   Lungs: Clear in all fields to A&P without rales, rhonchi or wheezing.   Heart: S1, S2. Regular rate and rhythm. No murmurs, rubs, or gallops.   Abdomen: Soft. Bowel sounds are normoactive. Nontender with palpation.   Extremities: No cyanosis or edema.  Neurologic: Grossly non-focal exam    Pain Score:  Pain Score    01/26/21 1301   PainSc: 0-No pain       PHQ-Score Total:  PHQ-9 Total Score: 2    IMAGING:        RECENT LABS:  Lab Results   Component Value Date    WBC 7.68 01/26/2021    HGB 17.1 (H) 01/26/2021    HCT 52.1 (H) 01/26/2021    MCV 95.4 01/26/2021    RDW 15.1 01/26/2021     01/26/2021    NEUTRORELPCT 62.1 01/26/2021     LYMPHORELPCT 27.7 01/26/2021    MONORELPCT 6.5 01/26/2021    EOSRELPCT 2.2 01/26/2021    BASORELPCT 1.2 01/26/2021    NEUTROABS 4.77 01/26/2021    LYMPHSABS 2.13 01/26/2021       Lab Results   Component Value Date     12/28/2017    K 3.8 12/28/2017    CO2 25.9 12/28/2017     12/28/2017    BUN 10 12/28/2017    CREATININE 0.90 12/28/2017    EGFRIFNONA 70 12/28/2017    GLUCOSE 83 12/28/2017    CALCIUM 9.1 12/28/2017    ALKPHOS 69 12/28/2017    AST 23 12/28/2017    ALT 28 12/28/2017    BILITOT 0.6 12/28/2017    ALBUMIN 4.40 12/28/2017    PROTEINTOT 7.0 12/28/2017         ASSESSMENT & PLAN:  Loida Glasgow is a very pleasant 40 y.o. female with    1.  Polycythemia, likely secondary:  -Ms. Glasgow has been evaluated by Dr. Messina and myself in the past for the above issue. However, she has been noncompliant with follow up and was last seen in December 2017. Previous workup in 2016 and 2017 was  negative for JAK2 V167F mutation and LUCIO 2 Exon 12 mutation. Erythropoietin level was also normal. Abdominal US revealed no splenomegaly. Subsequently checked MPL and CALR mutation analysis which were negative. BCR/ABL by PCR also negative.  - Therefore, workup was suggestive of secondary polycythemia (due to chronic heavy smoking).PFTs were ordered which she reports she could not complete due to anxiety. CXR revealed no abnormalities in January 2016. Recommended formal sleep study but she declined. Since her last visit, she has continued to smoking ~1-2 PPD. She has continued to follow with her PCP routinely with blood testing and Hct has remained elevated, ranging ~48-51%.  -Obtained repeat CBC today which showed Hct of 52.1. Therefore, will proceed with phlebotomy today. Also rechecked erythropoietin level which is pending.   -Given continued smoking, recommended repeat CXR and she was agreeable. Will order today. Again, recommended sleep study but she declined. She understands the importance of cutting back  and eventually quitting smoking as this is causing her Hg/Hct to be elevated.   -Will monitor with monthly CBC and prn phlebotomy to keep Hct <52%.   -Will follow up again in 3 months with repeat CBC.     ACO / SWAPNIL/Other  Quality measures  -  Loida Glasgow did not receive 2020 flu vaccine.  -  Loida Glasgow reports a pain score of 0.    -  Current outpatient and discharge medications have been reconciled for the patient.  Reviewed by: ELSA Maki      The patient was in agreement with the plan and all questions were answered to her satisfaction.     Thank you so much for allowing us to participate in the care of Loida Glasgow . Please do not hesitate to contact us with any questions or concerns.     A total of 45 minutes were spent coordinating this patient’s care in clinic today; more than 50% of this time was face-to-face with the patient, reviewing her medical history and counseling on the current treatment and followup plan. All questions were answered to her satisfaction.      Electronically Signed by: ELSA Maki , January 26, 2021 13:58 EST     CC:   ELSA Russell Crystal, APRN

## 2021-01-26 NOTE — PROGRESS NOTES
Patient completed her PHQ depression screening.    PHQ-9 Total Score:  2    Patient seen in office this date by provider.    SS will follow.

## 2021-01-27 LAB — EPO SERPL-ACNC: 5.4 MIU/ML (ref 2.6–18.5)

## 2021-02-05 ENCOUNTER — TRANSCRIBE ORDERS (OUTPATIENT)
Dept: ADMINISTRATIVE | Facility: HOSPITAL | Age: 41
End: 2021-02-05

## 2021-02-05 DIAGNOSIS — N17.9 ACUTE RENAL FAILURE, UNSPECIFIED ACUTE RENAL FAILURE TYPE (HCC): Primary | ICD-10-CM

## 2021-02-24 ENCOUNTER — HOSPITAL ENCOUNTER (EMERGENCY)
Facility: HOSPITAL | Age: 41
Discharge: HOME | End: 2021-02-24
Attending: FAMILY MEDICINE | Admitting: FAMILY MEDICINE

## 2021-02-24 ENCOUNTER — TELEPHONE (OUTPATIENT)
Dept: ONCOLOGY | Facility: CLINIC | Age: 41
End: 2021-02-24

## 2021-02-24 VITALS
SYSTOLIC BLOOD PRESSURE: 120 MMHG | TEMPERATURE: 98.7 F | HEART RATE: 100 BPM | WEIGHT: 165 LBS | RESPIRATION RATE: 18 BRPM | HEIGHT: 63 IN | DIASTOLIC BLOOD PRESSURE: 79 MMHG | BODY MASS INDEX: 29.23 KG/M2 | OXYGEN SATURATION: 97 %

## 2021-02-24 DIAGNOSIS — H10.9 BACTERIAL CONJUNCTIVITIS OF RIGHT EYE: Primary | ICD-10-CM

## 2021-02-24 PROCEDURE — 99283 EMERGENCY DEPT VISIT LOW MDM: CPT

## 2021-02-24 RX ORDER — ERYTHROMYCIN 5 MG/G
OINTMENT OPHTHALMIC
Qty: 1 G | Refills: 0 | Status: SHIPPED | OUTPATIENT
Start: 2021-02-24 | End: 2021-03-03

## 2021-02-24 RX ORDER — ERYTHROMYCIN 5 MG/G
1 OINTMENT OPHTHALMIC ONCE
Status: COMPLETED | OUTPATIENT
Start: 2021-02-24 | End: 2021-02-24

## 2021-02-24 RX ADMIN — ERYTHROMYCIN 1 APPLICATION: 5 OINTMENT OPHTHALMIC at 14:38

## 2021-02-24 NOTE — TELEPHONE ENCOUNTER
Caller: Loida Glasgow    Relationship to patient: self    Best call back number: [phone]    Type of visit: Lab    Requested date: 2/26/21     If rescheduling, when is the original appointment: 2/24/21    Additional notes: Pt rescheduled her lab appt from 2/24/21 to 2/26/21.    Please have pt fill out a verbal release while in the office. She wants to list her Mother.

## 2021-02-26 ENCOUNTER — LAB (OUTPATIENT)
Dept: ONCOLOGY | Facility: CLINIC | Age: 41
End: 2021-02-26

## 2021-02-26 VITALS
BODY MASS INDEX: 29.97 KG/M2 | SYSTOLIC BLOOD PRESSURE: 113 MMHG | WEIGHT: 169.2 LBS | RESPIRATION RATE: 18 BRPM | TEMPERATURE: 97.8 F | HEART RATE: 94 BPM | DIASTOLIC BLOOD PRESSURE: 79 MMHG | OXYGEN SATURATION: 98 %

## 2021-02-26 DIAGNOSIS — D75.1 POLYCYTHEMIA: ICD-10-CM

## 2021-02-26 LAB
BASOPHILS # BLD AUTO: 0.09 10*3/MM3 (ref 0–0.2)
BASOPHILS NFR BLD AUTO: 0.9 % (ref 0–1.5)
DEPRECATED RDW RBC AUTO: 52.8 FL (ref 37–54)
EOSINOPHIL # BLD AUTO: 0.15 10*3/MM3 (ref 0–0.4)
EOSINOPHIL NFR BLD AUTO: 1.4 % (ref 0.3–6.2)
ERYTHROCYTE [DISTWIDTH] IN BLOOD BY AUTOMATED COUNT: 14.7 % (ref 12.3–15.4)
HCT VFR BLD AUTO: 51.5 % (ref 34–46.6)
HGB BLD-MCNC: 16.7 G/DL (ref 12–15.9)
IMM GRANULOCYTES # BLD AUTO: 0.03 10*3/MM3 (ref 0–0.05)
IMM GRANULOCYTES NFR BLD AUTO: 0.3 % (ref 0–0.5)
LYMPHOCYTES # BLD AUTO: 2.47 10*3/MM3 (ref 0.7–3.1)
LYMPHOCYTES NFR BLD AUTO: 23.7 % (ref 19.6–45.3)
MCH RBC QN AUTO: 31.5 PG (ref 26.6–33)
MCHC RBC AUTO-ENTMCNC: 32.4 G/DL (ref 31.5–35.7)
MCV RBC AUTO: 97 FL (ref 79–97)
MONOCYTES # BLD AUTO: 0.56 10*3/MM3 (ref 0.1–0.9)
MONOCYTES NFR BLD AUTO: 5.4 % (ref 5–12)
NEUTROPHILS NFR BLD AUTO: 68.3 % (ref 42.7–76)
NEUTROPHILS NFR BLD AUTO: 7.13 10*3/MM3 (ref 1.7–7)
NRBC BLD AUTO-RTO: 0 /100 WBC (ref 0–0.2)
PLATELET # BLD AUTO: 397 10*3/MM3 (ref 140–450)
PMV BLD AUTO: 9.4 FL (ref 6–12)
RBC # BLD AUTO: 5.31 10*6/MM3 (ref 3.77–5.28)
WBC # BLD AUTO: 10.43 10*3/MM3 (ref 3.4–10.8)

## 2021-02-26 PROCEDURE — 85025 COMPLETE CBC W/AUTO DIFF WBC: CPT | Performed by: NURSE PRACTITIONER

## 2021-03-01 ENCOUNTER — APPOINTMENT (OUTPATIENT)
Dept: MAMMOGRAPHY | Facility: HOSPITAL | Age: 41
End: 2021-03-01

## 2021-03-24 ENCOUNTER — LAB (OUTPATIENT)
Dept: ONCOLOGY | Facility: CLINIC | Age: 41
End: 2021-03-24

## 2021-03-24 VITALS
SYSTOLIC BLOOD PRESSURE: 126 MMHG | RESPIRATION RATE: 16 BRPM | TEMPERATURE: 98 F | WEIGHT: 169 LBS | DIASTOLIC BLOOD PRESSURE: 87 MMHG | HEIGHT: 63 IN | OXYGEN SATURATION: 98 % | BODY MASS INDEX: 29.95 KG/M2 | HEART RATE: 85 BPM

## 2021-03-24 DIAGNOSIS — D75.1 POLYCYTHEMIA: ICD-10-CM

## 2021-03-24 LAB
BASOPHILS # BLD AUTO: 0.09 10*3/MM3 (ref 0–0.2)
BASOPHILS NFR BLD AUTO: 1.1 % (ref 0–1.5)
DEPRECATED RDW RBC AUTO: 50.9 FL (ref 37–54)
EOSINOPHIL # BLD AUTO: 0.15 10*3/MM3 (ref 0–0.4)
EOSINOPHIL NFR BLD AUTO: 1.8 % (ref 0.3–6.2)
ERYTHROCYTE [DISTWIDTH] IN BLOOD BY AUTOMATED COUNT: 14.2 % (ref 12.3–15.4)
HCT VFR BLD AUTO: 49 % (ref 34–46.6)
HGB BLD-MCNC: 16 G/DL (ref 12–15.9)
IMM GRANULOCYTES # BLD AUTO: 0.02 10*3/MM3 (ref 0–0.05)
IMM GRANULOCYTES NFR BLD AUTO: 0.2 % (ref 0–0.5)
LYMPHOCYTES # BLD AUTO: 2.1 10*3/MM3 (ref 0.7–3.1)
LYMPHOCYTES NFR BLD AUTO: 25.5 % (ref 19.6–45.3)
MCH RBC QN AUTO: 31.3 PG (ref 26.6–33)
MCHC RBC AUTO-ENTMCNC: 32.7 G/DL (ref 31.5–35.7)
MCV RBC AUTO: 95.7 FL (ref 79–97)
MONOCYTES # BLD AUTO: 0.47 10*3/MM3 (ref 0.1–0.9)
MONOCYTES NFR BLD AUTO: 5.7 % (ref 5–12)
NEUTROPHILS NFR BLD AUTO: 5.42 10*3/MM3 (ref 1.7–7)
NEUTROPHILS NFR BLD AUTO: 65.7 % (ref 42.7–76)
NRBC BLD AUTO-RTO: 0 /100 WBC (ref 0–0.2)
PLATELET # BLD AUTO: 365 10*3/MM3 (ref 140–450)
PMV BLD AUTO: 9.3 FL (ref 6–12)
RBC # BLD AUTO: 5.12 10*6/MM3 (ref 3.77–5.28)
WBC # BLD AUTO: 8.25 10*3/MM3 (ref 3.4–10.8)

## 2021-03-24 PROCEDURE — 85025 COMPLETE CBC W/AUTO DIFF WBC: CPT | Performed by: NURSE PRACTITIONER

## 2021-04-02 ENCOUNTER — APPOINTMENT (OUTPATIENT)
Dept: ULTRASOUND IMAGING | Facility: HOSPITAL | Age: 41
End: 2021-04-02

## 2021-04-05 ENCOUNTER — TRANSCRIBE ORDERS (OUTPATIENT)
Dept: ADMINISTRATIVE | Facility: HOSPITAL | Age: 41
End: 2021-04-05

## 2021-04-05 ENCOUNTER — LAB (OUTPATIENT)
Dept: LAB | Facility: HOSPITAL | Age: 41
End: 2021-04-05

## 2021-04-05 ENCOUNTER — HOSPITAL ENCOUNTER (OUTPATIENT)
Dept: ULTRASOUND IMAGING | Facility: HOSPITAL | Age: 41
Discharge: HOME | End: 2021-04-05

## 2021-04-05 DIAGNOSIS — N17.9 ACUTE RENAL FAILURE, UNSPECIFIED ACUTE RENAL FAILURE TYPE (HCC): ICD-10-CM

## 2021-04-05 DIAGNOSIS — N17.9 ACUTE RENAL FAILURE, UNSPECIFIED ACUTE RENAL FAILURE TYPE (HCC): Primary | ICD-10-CM

## 2021-04-05 PROCEDURE — 80053 COMPREHEN METABOLIC PANEL: CPT

## 2021-04-05 PROCEDURE — 82570 ASSAY OF URINE CREATININE: CPT

## 2021-04-05 PROCEDURE — 36415 COLL VENOUS BLD VENIPUNCTURE: CPT

## 2021-04-05 PROCEDURE — 76775 US EXAM ABDO BACK WALL LIM: CPT

## 2021-04-05 PROCEDURE — 81001 URINALYSIS AUTO W/SCOPE: CPT

## 2021-04-05 PROCEDURE — 76775 US EXAM ABDO BACK WALL LIM: CPT | Performed by: RADIOLOGY

## 2021-04-05 PROCEDURE — 84156 ASSAY OF PROTEIN URINE: CPT

## 2021-04-05 PROCEDURE — 82043 UR ALBUMIN QUANTITATIVE: CPT

## 2021-04-06 LAB
ALBUMIN SERPL-MCNC: 4.3 G/DL (ref 3.5–5.2)
ALBUMIN UR-MCNC: <1.2 MG/DL
ALBUMIN/GLOB SERPL: 1.7 G/DL
ALP SERPL-CCNC: 67 U/L (ref 39–117)
ALT SERPL W P-5'-P-CCNC: 19 U/L (ref 1–33)
ANION GAP SERPL CALCULATED.3IONS-SCNC: 10.4 MMOL/L (ref 5–15)
AST SERPL-CCNC: 17 U/L (ref 1–32)
BACTERIA UR QL AUTO: ABNORMAL /HPF
BILIRUB SERPL-MCNC: 0.6 MG/DL (ref 0–1.2)
BILIRUB UR QL STRIP: NEGATIVE
BUN SERPL-MCNC: 11 MG/DL (ref 6–20)
BUN/CREAT SERPL: 11 (ref 7–25)
CALCIUM SPEC-SCNC: 9.5 MG/DL (ref 8.6–10.5)
CHLORIDE SERPL-SCNC: 105 MMOL/L (ref 98–107)
CLARITY UR: ABNORMAL
CO2 SERPL-SCNC: 26.6 MMOL/L (ref 22–29)
COLOR UR: YELLOW
CREAT SERPL-MCNC: 1 MG/DL (ref 0.57–1)
CREAT UR-MCNC: 116.1 MG/DL
CREAT UR-MCNC: 116.1 MG/DL
GFR SERPL CREATININE-BSD FRML MDRD: 61 ML/MIN/1.73
GLOBULIN UR ELPH-MCNC: 2.5 GM/DL
GLUCOSE SERPL-MCNC: 63 MG/DL (ref 65–99)
GLUCOSE UR STRIP-MCNC: NEGATIVE MG/DL
HGB UR QL STRIP.AUTO: NEGATIVE
HYALINE CASTS UR QL AUTO: ABNORMAL /LPF
KETONES UR QL STRIP: NEGATIVE
LEUKOCYTE ESTERASE UR QL STRIP.AUTO: NEGATIVE
MICROALBUMIN/CREAT UR: NORMAL MG/G{CREAT}
NITRITE UR QL STRIP: POSITIVE
PH UR STRIP.AUTO: 6 [PH] (ref 5–8)
POTASSIUM SERPL-SCNC: 4.1 MMOL/L (ref 3.5–5.2)
PROT SERPL-MCNC: 6.8 G/DL (ref 6–8.5)
PROT UR QL STRIP: NEGATIVE
PROT UR-MCNC: 9 MG/DL
PROT/CREAT UR: 77.5 MG/G CREA (ref 0–200)
RBC # UR: ABNORMAL /HPF
REF LAB TEST METHOD: ABNORMAL
SODIUM SERPL-SCNC: 142 MMOL/L (ref 136–145)
SP GR UR STRIP: 1.02 (ref 1–1.03)
SQUAMOUS #/AREA URNS HPF: ABNORMAL /HPF
UROBILINOGEN UR QL STRIP: ABNORMAL
WBC UR QL AUTO: ABNORMAL /HPF

## 2021-04-08 ENCOUNTER — APPOINTMENT (OUTPATIENT)
Dept: ULTRASOUND IMAGING | Facility: HOSPITAL | Age: 41
End: 2021-04-08

## 2021-04-21 ENCOUNTER — LAB (OUTPATIENT)
Dept: ONCOLOGY | Facility: CLINIC | Age: 41
End: 2021-04-21

## 2021-04-21 ENCOUNTER — OFFICE VISIT (OUTPATIENT)
Dept: ONCOLOGY | Facility: CLINIC | Age: 41
End: 2021-04-21

## 2021-04-21 VITALS
BODY MASS INDEX: 30.08 KG/M2 | DIASTOLIC BLOOD PRESSURE: 80 MMHG | OXYGEN SATURATION: 98 % | RESPIRATION RATE: 18 BRPM | SYSTOLIC BLOOD PRESSURE: 136 MMHG | WEIGHT: 169.8 LBS | HEART RATE: 82 BPM | TEMPERATURE: 97.8 F

## 2021-04-21 DIAGNOSIS — D75.1 POLYCYTHEMIA: ICD-10-CM

## 2021-04-21 DIAGNOSIS — D75.1 POLYCYTHEMIA: Primary | ICD-10-CM

## 2021-04-21 LAB
BASOPHILS # BLD AUTO: 0.09 10*3/MM3 (ref 0–0.2)
BASOPHILS NFR BLD AUTO: 0.9 % (ref 0–1.5)
DEPRECATED RDW RBC AUTO: 50.4 FL (ref 37–54)
EOSINOPHIL # BLD AUTO: 0.19 10*3/MM3 (ref 0–0.4)
EOSINOPHIL NFR BLD AUTO: 1.8 % (ref 0.3–6.2)
ERYTHROCYTE [DISTWIDTH] IN BLOOD BY AUTOMATED COUNT: 14.6 % (ref 12.3–15.4)
HCT VFR BLD AUTO: 50 % (ref 34–46.6)
HGB BLD-MCNC: 16.6 G/DL (ref 12–15.9)
IMM GRANULOCYTES # BLD AUTO: 0.03 10*3/MM3 (ref 0–0.05)
IMM GRANULOCYTES NFR BLD AUTO: 0.3 % (ref 0–0.5)
LYMPHOCYTES # BLD AUTO: 2.6 10*3/MM3 (ref 0.7–3.1)
LYMPHOCYTES NFR BLD AUTO: 24.9 % (ref 19.6–45.3)
MCH RBC QN AUTO: 31.4 PG (ref 26.6–33)
MCHC RBC AUTO-ENTMCNC: 33.2 G/DL (ref 31.5–35.7)
MCV RBC AUTO: 94.7 FL (ref 79–97)
MONOCYTES # BLD AUTO: 0.67 10*3/MM3 (ref 0.1–0.9)
MONOCYTES NFR BLD AUTO: 6.4 % (ref 5–12)
NEUTROPHILS NFR BLD AUTO: 6.87 10*3/MM3 (ref 1.7–7)
NEUTROPHILS NFR BLD AUTO: 65.7 % (ref 42.7–76)
NRBC BLD AUTO-RTO: 0 /100 WBC (ref 0–0.2)
PLATELET # BLD AUTO: 359 10*3/MM3 (ref 140–450)
PMV BLD AUTO: 9.2 FL (ref 6–12)
RBC # BLD AUTO: 5.28 10*6/MM3 (ref 3.77–5.28)
WBC # BLD AUTO: 10.45 10*3/MM3 (ref 3.4–10.8)

## 2021-04-21 PROCEDURE — 85025 COMPLETE CBC W/AUTO DIFF WBC: CPT | Performed by: NURSE PRACTITIONER

## 2021-04-21 PROCEDURE — 99213 OFFICE O/P EST LOW 20 MIN: CPT | Performed by: NURSE PRACTITIONER

## 2021-04-21 NOTE — PROGRESS NOTES
"DATE:  4/21/2021    REASON FOR FOLLOW UP: Polycythemia    REFERRING PHYSICIAN:  ELSA Russell    CHIEF COMPLAINT:  Follow up of polycythemia    HISTORY OF PRESENT ILLNESS:   Loida Glasgow is a  41 y.o. female , known to us, who is being seen today at the request of ELSA Russell  for evaluation and treatment of polycythemia. Ms. Glasgow has been evaluated by Dr. Messina and myself in the past for the above issue. However, she has been noncompliant with follow up and was last seen in December 2017. Previous workup in 2016 and 2017 was  negative for JAK2 V167F mutation and LUCIO 2 Exon 12 mutation. Erythropoietin level was also normal. Abdominal US revealed no splenomegaly. Subsequently checked MPL and CALR mutation analysis which were negative. BCR/ABL by PCR also negative. Therefore, workup was suggestive of secondary polycythemia (due to chronic heavy smoking).PFTs were ordered  (given that she is a chronic smoker) which she reports she could not complete due to anxiety. CXR revealed no abnormalities in January 2016. Recommended formal sleep study but she declined. Since her last visit, she has continued to smoking ~1-2 PPD which she says is better. \"I used to smoke like a [employer] train.\" She has continued to follow with her PCP routinely with blood testing and Hct has remained elevated, ranging ~48-51%. At present, overall, she reports feeling well. She complains of occasional dry cough and headaches. Denies any focal weakness or dizziness. She says she is a very anxious person. She denies any other complaints today.     INTERVAL HISTORY:  Ms. Glasgow presents today for follow up. Since her last visit, she reports she has been doing well. She continues to smoke but says she is trying to cut back (currently ~1-2 PPD). She also says she thinks eating poorly causes her RBCs to be elevated and she has been trying to make healthier food choices and not eat out as much. She reports feeling well today " and denies any specific complaints. We have been monitoring her CBC and she has not required phlebotomy since January 2021.     PAST MEDICAL HISTORY:  History reviewed. No pertinent past medical history.    PAST SURGICAL HISTORY:  Past Surgical History:   Procedure Laterality Date   • TUBAL ABDOMINAL LIGATION  2001     FAMILY HISTORY:  History reviewed. No pertinent family history.    SOCIAL HISTORY:  Social History     Socioeconomic History   • Marital status: Single     Spouse name: Not on file   • Number of children: Not on file   • Years of education: Not on file   • Highest education level: Not on file   Tobacco Use   • Smoking status: Current Every Day Smoker     Packs/day: 2.00     Years: 17.00     Pack years: 34.00     Types: Cigarettes   • Smokeless tobacco: Never Used   Substance and Sexual Activity   • Alcohol use: No   • Drug use: No   • Sexual activity: Defer     MEDICATIONS:  The current medication list was reviewed in the EMR    Current Outpatient Medications:   •  simvastatin (ZOCOR) 20 MG tablet, Take 20 mg by mouth Every Night., Disp: , Rfl:     ALLERGIES:    Allergies   Allergen Reactions   • Amoxicillin Other (See Comments)     Childhood allergy   • Penicillins Other (See Comments)     Childhood  allergy       REVIEW OF SYSTEMS:    A comprehensive 14 point review of systems was performed.  Significant findings as mentioned above.  All other systems reviewed and are negative.      Physical Exam   Vital Signs: /80   Pulse 82   Temp 97.8 °F (36.6 °C) (Temporal)   Resp 18   Wt 77 kg (169 lb 12.8 oz)   SpO2 98%   BMI 30.08 kg/m²    General: Well developed, well nourished, alert and oriented x 3, in no acute distress.  Head: ATNC   Eyes: PERRL, No evidence of conjunctivitis.   Nose: No nasal discharge.   Mouth: Oral mucosal membranes moist. No oral ulceration or hemorrhages.   Neck: Neck supple. No thyromegaly. No JVD.   Lungs: Clear in all fields to A&P without rales, rhonchi or wheezing.    Heart: S1, S2. Regular rate and rhythm. No murmurs, rubs, or gallops.   Abdomen: Soft. Bowel sounds are normoactive. Nontender with palpation.   Extremities: No cyanosis or edema.  Neurologic: Grossly non-focal exam    Pain Score:  Pain Score    04/21/21 1338   PainSc: 0-No pain     IMAGING:  CXR 1/26/21  FINDINGS:    Lungs:  Unremarkable.  No consolidation.    Pleural space:  Unremarkable.  No pneumothorax.    Heart:  Unremarkable.  No cardiomegaly.    Mediastinum:  Unremarkable.    Bones/joints:  Unremarkable.     IMPRESSION:    No acute findings in the chest.            RECENT LABS:  Lab Results   Component Value Date    WBC 10.45 04/21/2021    HGB 16.6 (H) 04/21/2021    HCT 50.0 (H) 04/21/2021    MCV 94.7 04/21/2021    RDW 14.6 04/21/2021     04/21/2021    NEUTRORELPCT 65.7 04/21/2021    LYMPHORELPCT 24.9 04/21/2021    MONORELPCT 6.4 04/21/2021    EOSRELPCT 1.8 04/21/2021    BASORELPCT 0.9 04/21/2021    NEUTROABS 6.87 04/21/2021    LYMPHSABS 2.60 04/21/2021       Lab Results   Component Value Date     04/05/2021    K 4.1 04/05/2021    CO2 26.6 04/05/2021     04/05/2021    BUN 11 04/05/2021    CREATININE 1.00 04/05/2021    EGFRIFNONA 61 04/05/2021    GLUCOSE 63 (L) 04/05/2021    CALCIUM 9.5 04/05/2021    ALKPHOS 67 04/05/2021    AST 17 04/05/2021    ALT 19 04/05/2021    BILITOT 0.6 04/05/2021    ALBUMIN 4.30 04/05/2021    PROTEINTOT 6.8 04/05/2021         ASSESSMENT & PLAN:  Loida Glasgow is a very pleasant 41 y.o. female with    1.  Polycythemia, likely secondary:  -Ms. Glasgow has been evaluated by Dr. Messina and myself in the past for the above issue. However, she has been noncompliant with follow up. Previous workup in 2016 and 2017 was  negative for JAK2 V167F mutation and LUCIO 2 Exon 12 mutation. Erythropoietin level was also normal. Abdominal US revealed no splenomegaly. Subsequently checked MPL and CALR mutation analysis which were negative. BCR/ABL by PCR also negative.  -  Therefore, workup was suggestive of secondary polycythemia (due to chronic heavy smoking).PFTs were ordered which she reports she could not complete due to anxiety. CXR revealed no abnormalities in January 2016. Recommended formal sleep study but she declined. She has continued to smoking ~1-2 PPD. She has been followomg with her PCP routinely with blood testing and Hct has remained elevated, ranging ~48-51%.  -Obtained repeat CBC on initial consultation which showed Hct of 52.1. Therefore, proceeded with phlebotomy. Also rechecked erythropoietin level which was normal.   -Given continued smoking, recommended repeat CXR which was done and again unremarkable. Again, recommended sleep study today but she declined.   -We have been monitoring her CBC monthly with prn phlebotomy as above. Her most recent phlebotomy was on 1/26/21.   -Repeat CBC today showed Hct of 50; therefore, phlebotomy is not currently indicated.   -Will continue to monitor. Will check CBC  [id number] and prn phlebotomy to keep Hct <52%.   -Will follow up again in 6 months with repeat CBC. In the meantime, she was again, strongly advised to cut back/quit smoking.     ACO / SWAPNIL/Other  Quality measures  -  Loida Glasgow did not receive 2020 flu vaccine.  -  Loida Glasgow reports a pain score of 0.    -  Current outpatient and discharge medications have been reconciled for the patient.  Reviewed by: ELSA Maki      The patient was in agreement with the plan and all questions were answered to her satisfaction.     Thank you so much for allowing us to participate in the care of Loida Glasgow . Please do not hesitate to contact us with any questions or concerns.     A total of 20 minutes were spent coordinating this patient’s care in clinic today; more than 50% of this time was face-to-face with the patient, reviewing her medical history and counseling on the current treatment and followup plan. All questions were answered to her  satisfaction.      Electronically Signed by: ELSA Maki , April 21, 2021 13:29 EDT     CC:     Andree Morelos APRN

## 2021-06-24 ENCOUNTER — LAB (OUTPATIENT)
Dept: ONCOLOGY | Facility: CLINIC | Age: 41
End: 2021-06-24

## 2021-06-24 DIAGNOSIS — D75.1 POLYCYTHEMIA: ICD-10-CM

## 2021-06-24 LAB
BASOPHILS # BLD AUTO: 0.06 10*3/MM3 (ref 0–0.2)
BASOPHILS NFR BLD AUTO: 0.6 % (ref 0–1.5)
DEPRECATED RDW RBC AUTO: 52.1 FL (ref 37–54)
EOSINOPHIL # BLD AUTO: 0.16 10*3/MM3 (ref 0–0.4)
EOSINOPHIL NFR BLD AUTO: 1.6 % (ref 0.3–6.2)
ERYTHROCYTE [DISTWIDTH] IN BLOOD BY AUTOMATED COUNT: 15.5 % (ref 12.3–15.4)
HCT VFR BLD AUTO: 49.7 % (ref 34–46.6)
HGB BLD-MCNC: 16.2 G/DL (ref 12–15.9)
IMM GRANULOCYTES # BLD AUTO: 0.03 10*3/MM3 (ref 0–0.05)
IMM GRANULOCYTES NFR BLD AUTO: 0.3 % (ref 0–0.5)
LYMPHOCYTES # BLD AUTO: 3.23 10*3/MM3 (ref 0.7–3.1)
LYMPHOCYTES NFR BLD AUTO: 32.9 % (ref 19.6–45.3)
MCH RBC QN AUTO: 29.9 PG (ref 26.6–33)
MCHC RBC AUTO-ENTMCNC: 32.6 G/DL (ref 31.5–35.7)
MCV RBC AUTO: 91.7 FL (ref 79–97)
MONOCYTES # BLD AUTO: 0.52 10*3/MM3 (ref 0.1–0.9)
MONOCYTES NFR BLD AUTO: 5.3 % (ref 5–12)
NEUTROPHILS NFR BLD AUTO: 5.81 10*3/MM3 (ref 1.7–7)
NEUTROPHILS NFR BLD AUTO: 59.3 % (ref 42.7–76)
NRBC BLD AUTO-RTO: 0 /100 WBC (ref 0–0.2)
PLATELET # BLD AUTO: 308 10*3/MM3 (ref 140–450)
PMV BLD AUTO: 9.8 FL (ref 6–12)
RBC # BLD AUTO: 5.42 10*6/MM3 (ref 3.77–5.28)
WBC # BLD AUTO: 9.81 10*3/MM3 (ref 3.4–10.8)

## 2021-06-24 PROCEDURE — 85025 COMPLETE CBC W/AUTO DIFF WBC: CPT | Performed by: NURSE PRACTITIONER

## 2021-08-26 ENCOUNTER — LAB (OUTPATIENT)
Dept: ONCOLOGY | Facility: CLINIC | Age: 41
End: 2021-08-26

## 2021-08-26 VITALS
TEMPERATURE: 97.7 F | SYSTOLIC BLOOD PRESSURE: 126 MMHG | OXYGEN SATURATION: 97 % | RESPIRATION RATE: 18 BRPM | DIASTOLIC BLOOD PRESSURE: 81 MMHG | HEART RATE: 87 BPM

## 2021-08-26 DIAGNOSIS — D75.1 POLYCYTHEMIA: ICD-10-CM

## 2021-08-26 LAB
BASOPHILS # BLD AUTO: 0.06 10*3/MM3 (ref 0–0.2)
BASOPHILS NFR BLD AUTO: 0.7 % (ref 0–1.5)
DEPRECATED RDW RBC AUTO: 54.2 FL (ref 37–54)
EOSINOPHIL # BLD AUTO: 0.18 10*3/MM3 (ref 0–0.4)
EOSINOPHIL NFR BLD AUTO: 2 % (ref 0.3–6.2)
ERYTHROCYTE [DISTWIDTH] IN BLOOD BY AUTOMATED COUNT: 15.8 % (ref 12.3–15.4)
HCT VFR BLD AUTO: 48.7 % (ref 34–46.6)
HGB BLD-MCNC: 16.2 G/DL (ref 12–15.9)
IMM GRANULOCYTES # BLD AUTO: 0.02 10*3/MM3 (ref 0–0.05)
IMM GRANULOCYTES NFR BLD AUTO: 0.2 % (ref 0–0.5)
LYMPHOCYTES # BLD AUTO: 2.91 10*3/MM3 (ref 0.7–3.1)
LYMPHOCYTES NFR BLD AUTO: 32.8 % (ref 19.6–45.3)
MCH RBC QN AUTO: 30.7 PG (ref 26.6–33)
MCHC RBC AUTO-ENTMCNC: 33.3 G/DL (ref 31.5–35.7)
MCV RBC AUTO: 92.4 FL (ref 79–97)
MONOCYTES # BLD AUTO: 0.61 10*3/MM3 (ref 0.1–0.9)
MONOCYTES NFR BLD AUTO: 6.9 % (ref 5–12)
NEUTROPHILS NFR BLD AUTO: 5.09 10*3/MM3 (ref 1.7–7)
NEUTROPHILS NFR BLD AUTO: 57.4 % (ref 42.7–76)
NRBC BLD AUTO-RTO: 0 /100 WBC (ref 0–0.2)
PLATELET # BLD AUTO: 324 10*3/MM3 (ref 140–450)
PMV BLD AUTO: 9.7 FL (ref 6–12)
RBC # BLD AUTO: 5.27 10*6/MM3 (ref 3.77–5.28)
WBC # BLD AUTO: 8.87 10*3/MM3 (ref 3.4–10.8)

## 2021-08-26 PROCEDURE — 85025 COMPLETE CBC W/AUTO DIFF WBC: CPT | Performed by: NURSE PRACTITIONER

## 2021-10-28 ENCOUNTER — OFFICE VISIT (OUTPATIENT)
Dept: ONCOLOGY | Facility: CLINIC | Age: 41
End: 2021-10-28

## 2021-10-28 ENCOUNTER — LAB (OUTPATIENT)
Dept: ONCOLOGY | Facility: CLINIC | Age: 41
End: 2021-10-28

## 2021-10-28 VITALS
OXYGEN SATURATION: 99 % | DIASTOLIC BLOOD PRESSURE: 78 MMHG | BODY MASS INDEX: 32.5 KG/M2 | HEART RATE: 85 BPM | HEIGHT: 62 IN | WEIGHT: 176.6 LBS | RESPIRATION RATE: 18 BRPM | SYSTOLIC BLOOD PRESSURE: 114 MMHG | TEMPERATURE: 97.7 F

## 2021-10-28 DIAGNOSIS — D75.1 POLYCYTHEMIA: Primary | ICD-10-CM

## 2021-10-28 DIAGNOSIS — D75.1 POLYCYTHEMIA: ICD-10-CM

## 2021-10-28 LAB
BASOPHILS # BLD AUTO: 0.07 10*3/MM3 (ref 0–0.2)
BASOPHILS NFR BLD AUTO: 0.6 % (ref 0–1.5)
DEPRECATED RDW RBC AUTO: 53.5 FL (ref 37–54)
EOSINOPHIL # BLD AUTO: 0.15 10*3/MM3 (ref 0–0.4)
EOSINOPHIL NFR BLD AUTO: 1.3 % (ref 0.3–6.2)
ERYTHROCYTE [DISTWIDTH] IN BLOOD BY AUTOMATED COUNT: 15.3 % (ref 12.3–15.4)
HCT VFR BLD AUTO: 49.3 % (ref 34–46.6)
HGB BLD-MCNC: 16.1 G/DL (ref 12–15.9)
IMM GRANULOCYTES # BLD AUTO: 0.04 10*3/MM3 (ref 0–0.05)
IMM GRANULOCYTES NFR BLD AUTO: 0.3 % (ref 0–0.5)
LYMPHOCYTES # BLD AUTO: 3.34 10*3/MM3 (ref 0.7–3.1)
LYMPHOCYTES NFR BLD AUTO: 28.2 % (ref 19.6–45.3)
MCH RBC QN AUTO: 30.8 PG (ref 26.6–33)
MCHC RBC AUTO-ENTMCNC: 32.7 G/DL (ref 31.5–35.7)
MCV RBC AUTO: 94.3 FL (ref 79–97)
MONOCYTES # BLD AUTO: 0.74 10*3/MM3 (ref 0.1–0.9)
MONOCYTES NFR BLD AUTO: 6.3 % (ref 5–12)
NEUTROPHILS NFR BLD AUTO: 63.3 % (ref 42.7–76)
NEUTROPHILS NFR BLD AUTO: 7.49 10*3/MM3 (ref 1.7–7)
NRBC BLD AUTO-RTO: 0 /100 WBC (ref 0–0.2)
PLATELET # BLD AUTO: 352 10*3/MM3 (ref 140–450)
PMV BLD AUTO: 9.6 FL (ref 6–12)
RBC # BLD AUTO: 5.23 10*6/MM3 (ref 3.77–5.28)
WBC # BLD AUTO: 11.83 10*3/MM3 (ref 3.4–10.8)

## 2021-10-28 PROCEDURE — 99213 OFFICE O/P EST LOW 20 MIN: CPT | Performed by: NURSE PRACTITIONER

## 2021-10-28 PROCEDURE — 85025 COMPLETE CBC W/AUTO DIFF WBC: CPT | Performed by: NURSE PRACTITIONER

## 2021-10-28 RX ORDER — FAMOTIDINE 20 MG/1
20 TABLET, FILM COATED ORAL 2 TIMES DAILY
COMMUNITY

## 2021-10-28 NOTE — PROGRESS NOTES
"DATE:  10/28/2021    REASON FOR FOLLOW UP: Polycythemia    REFERRING PHYSICIAN:  ELSA Russell    CHIEF COMPLAINT:  Follow up of polycythemia    HISTORY OF PRESENT ILLNESS:   Loida Glasgow is a  41 y.o. female , known to us, who is being seen today at the request of ELSA Russell  for evaluation and treatment of polycythemia. Ms. Glasgow has been evaluated by Dr. Messina and myself in the past for the above issue. However, she has been noncompliant with follow up and was last seen in December 2017. Previous workup in 2016 and 2017 was  negative for JAK2 V167F mutation and LUCIO 2 Exon 12 mutation. Erythropoietin level was also normal. Abdominal US revealed no splenomegaly. Subsequently checked MPL and CALR mutation analysis which were negative. BCR/ABL by PCR also negative. Therefore, workup was suggestive of secondary polycythemia (due to chronic heavy smoking).PFTs were ordered  (given that she is a chronic smoker) which she reports she could not complete due to anxiety. CXR revealed no abnormalities in January 2016. Recommended formal sleep study but she declined. Since her last visit, she has continued to smoking ~1-2 PPD which she says is better. \"I used to smoke like a [employer] train.\" She has continued to follow with her PCP routinely with blood testing and Hct has remained elevated, ranging ~48-51%. At present, overall, she reports feeling well. She complains of occasional dry cough and headaches. Denies any focal weakness or dizziness. She says she is a very anxious person. She denies any other complaints today.     INTERVAL HISTORY:  Ms. Glasgow presents today for follow up. Since her last visit, she has been trying to cut back on smoking, currently <1PPD. As a result, she reports improvement in shortness of breath and cough. Otherwise she reports feeling overall well and denies any other specific complaints today. Happily, she has not required phlebotomy since January 2021.     PAST " "MEDICAL HISTORY:  History reviewed. No pertinent past medical history.    PAST SURGICAL HISTORY:  Past Surgical History:   Procedure Laterality Date   • TUBAL ABDOMINAL LIGATION  2001     FAMILY HISTORY:  History reviewed. No pertinent family history.    SOCIAL HISTORY:  Social History     Socioeconomic History   • Marital status: Single   Tobacco Use   • Smoking status: Current Every Day Smoker     Packs/day: 2.00     Years: 17.00     Pack years: 34.00     Types: Cigarettes   • Smokeless tobacco: Never Used   Vaping Use   • Vaping Use: Never used   Substance and Sexual Activity   • Alcohol use: No   • Drug use: No   • Sexual activity: Defer     MEDICATIONS:  The current medication list was reviewed in the EMR    Current Outpatient Medications:   •  famotidine (PEPCID) 20 MG tablet, Take 20 mg by mouth 2 (Two) Times a Day., Disp: , Rfl:   •  simvastatin (ZOCOR) 20 MG tablet, Take 20 mg by mouth Every Night., Disp: , Rfl:     ALLERGIES:    Allergies   Allergen Reactions   • Amoxicillin Other (See Comments)     Childhood allergy   • Penicillins Other (See Comments)     Childhood  allergy       REVIEW OF SYSTEMS:    A comprehensive 14 point review of systems was performed.  Significant findings as mentioned above.  All other systems reviewed and are negative.      Physical Exam   Vital Signs: /78   Pulse 85   Temp 97.7 °F (36.5 °C) (Temporal)   Resp 18   Ht 157.5 cm (62\")   Wt 80.1 kg (176 lb 9.6 oz)   SpO2 99%   BMI 32.30 kg/m²    General: Well developed, well nourished, alert and oriented x 3, in no acute distress.  Head: ATNC   Eyes: PERRL, No evidence of conjunctivitis.   Nose: No nasal discharge.   Mouth: Oral mucosal membranes moist. No oral ulceration or hemorrhages.   Neck: Neck supple. No thyromegaly. No JVD.   Lungs: CTAB  Heart: Regular rate and rhythm. No murmurs, rubs, or gallops.   Abdomen: Soft. Bowel sounds are normoactive. Nontender with palpation.   Extremities: No cyanosis or " edema.  Neurologic: Grossly non-focal exam    Pain Score:  Pain Score    10/28/21 1351   PainSc: 0-No pain     IMAGING:  CXR 1/26/21  FINDINGS:    Lungs:  Unremarkable.  No consolidation.    Pleural space:  Unremarkable.  No pneumothorax.    Heart:  Unremarkable.  No cardiomegaly.    Mediastinum:  Unremarkable.    Bones/joints:  Unremarkable.     IMPRESSION:    No acute findings in the chest.            RECENT LABS:  Lab Results   Component Value Date    WBC 11.83 (H) 10/28/2021    HGB 16.1 (H) 10/28/2021    HCT 49.3 (H) 10/28/2021    MCV 94.3 10/28/2021    RDW 15.3 10/28/2021     10/28/2021    NEUTRORELPCT 63.3 10/28/2021    LYMPHORELPCT 28.2 10/28/2021    MONORELPCT 6.3 10/28/2021    EOSRELPCT 1.3 10/28/2021    BASORELPCT 0.6 10/28/2021    NEUTROABS 7.49 (H) 10/28/2021    LYMPHSABS 3.34 (H) 10/28/2021       Lab Results   Component Value Date     04/05/2021    K 4.1 04/05/2021    CO2 26.6 04/05/2021     04/05/2021    BUN 11 04/05/2021    CREATININE 1.00 04/05/2021    EGFRIFNONA 61 04/05/2021    GLUCOSE 63 (L) 04/05/2021    CALCIUM 9.5 04/05/2021    ALKPHOS 67 04/05/2021    AST 17 04/05/2021    ALT 19 04/05/2021    BILITOT 0.6 04/05/2021    ALBUMIN 4.30 04/05/2021    PROTEINTOT 6.8 04/05/2021         ASSESSMENT & PLAN:  Loida Glasgow is a very pleasant 41 y.o. female with    1.  Polycythemia, likely secondary:  -Ms. Glasgow has been evaluated by Dr. Messina and myself in the past for the above issue. She was previously noncompliant but is now following as advised. Previous workup in 2016 and 2017 was  negative for JAK2 V167F mutation and LUCIO 2 Exon 12 mutation. Erythropoietin level was also normal. Abdominal US revealed no splenomegaly. Subsequently checked MPL and CALR mutation analysis which were negative. BCR/ABL by PCR also negative.  - Therefore, workup was suggestive of secondary polycythemia (due to chronic heavy smoking).PFTs were ordered which she reports she could not complete due  to anxiety. CXR revealed no abnormalities in January 2016. Recommended formal sleep study but she declined. She has continued to smoke ~1-2 PPD. She has been following with her PCP routinely with blood testing and Hct has remained elevated, ranging ~48-51%.  -Obtained repeat CBC on initial consultation which showed Hct of 52.1. Therefore, proceeded with phlebotomy. Also rechecked erythropoietin level which was normal.   -Given continued smoking, recommended repeat CXR which was done and again unremarkable. Again, recommended sleep study but she declined.   -She has not required phlebotomy since 1/26/21. Since her last visit, she has been cutting back on smoking, now down to <1PPD.   -Repeat CBC today showed Hct of 49.3; therefore, phlebotomy is not currently indicated.   -Will continue to monitor. Will check CBC in 3 months with prn phlebotomy to keep Hct <52%.   -Will follow up again in 6 months with repeat CBC. In the meantime, she will continue to cut back/quit smoking.     2. Leukocytosis:  -WBC ~11.83 today. Previously been normal. Likely reactive and secondary to chronic heavy smoking. Will monitor.     ACO / SWAPNIL/Other  Quality measures  -  Loida Glasgow did not receive 2021 flu vaccine. She declined. Declined COVID vaccine.   -  Loida Glasgow reports a pain score of 0.    -  Current outpatient and discharge medications have been reconciled for the patient.  Reviewed by: ELSA Maki      The patient was in agreement with the plan and all questions were answered to her satisfaction.     Thank you so much for allowing us to participate in the care of Loida Glasgow . Please do not hesitate to contact us with any questions or concerns.     A total of 20 minutes were spent coordinating this patient’s care in clinic today; more than 50% of this time was face-to-face with the patient, reviewing her medical history and counseling on the current treatment and followup plan. All questions were  answered to her satisfaction.      Electronically Signed by: ELSA Maki , October 28, 2021 14:31 EDT     CC:     Andree Morelos APRN

## 2022-01-28 ENCOUNTER — LAB (OUTPATIENT)
Dept: ONCOLOGY | Facility: CLINIC | Age: 42
End: 2022-01-28

## 2022-01-28 VITALS
DIASTOLIC BLOOD PRESSURE: 87 MMHG | SYSTOLIC BLOOD PRESSURE: 130 MMHG | WEIGHT: 179 LBS | RESPIRATION RATE: 18 BRPM | OXYGEN SATURATION: 98 % | BODY MASS INDEX: 31.71 KG/M2 | TEMPERATURE: 97.7 F | HEIGHT: 63 IN | HEART RATE: 97 BPM

## 2022-01-28 DIAGNOSIS — D75.1 POLYCYTHEMIA: ICD-10-CM

## 2022-01-28 LAB
BASOPHILS # BLD AUTO: 0.06 10*3/MM3 (ref 0–0.2)
BASOPHILS NFR BLD AUTO: 0.5 % (ref 0–1.5)
DEPRECATED RDW RBC AUTO: 52.7 FL (ref 37–54)
EOSINOPHIL # BLD AUTO: 0.09 10*3/MM3 (ref 0–0.4)
EOSINOPHIL NFR BLD AUTO: 0.8 % (ref 0.3–6.2)
ERYTHROCYTE [DISTWIDTH] IN BLOOD BY AUTOMATED COUNT: 15.3 % (ref 12.3–15.4)
HCT VFR BLD AUTO: 50.8 % (ref 34–46.6)
HGB BLD-MCNC: 16.7 G/DL (ref 12–15.9)
IMM GRANULOCYTES # BLD AUTO: 0.05 10*3/MM3 (ref 0–0.05)
IMM GRANULOCYTES NFR BLD AUTO: 0.4 % (ref 0–0.5)
LYMPHOCYTES # BLD AUTO: 2.8 10*3/MM3 (ref 0.7–3.1)
LYMPHOCYTES NFR BLD AUTO: 24.3 % (ref 19.6–45.3)
MCH RBC QN AUTO: 30.8 PG (ref 26.6–33)
MCHC RBC AUTO-ENTMCNC: 32.9 G/DL (ref 31.5–35.7)
MCV RBC AUTO: 93.6 FL (ref 79–97)
MONOCYTES # BLD AUTO: 0.52 10*3/MM3 (ref 0.1–0.9)
MONOCYTES NFR BLD AUTO: 4.5 % (ref 5–12)
NEUTROPHILS NFR BLD AUTO: 69.5 % (ref 42.7–76)
NEUTROPHILS NFR BLD AUTO: 8.02 10*3/MM3 (ref 1.7–7)
NRBC BLD AUTO-RTO: 0 /100 WBC (ref 0–0.2)
PLATELET # BLD AUTO: 387 10*3/MM3 (ref 140–450)
PMV BLD AUTO: 9.6 FL (ref 6–12)
RBC # BLD AUTO: 5.43 10*6/MM3 (ref 3.77–5.28)
WBC NRBC COR # BLD: 11.54 10*3/MM3 (ref 3.4–10.8)

## 2022-01-28 PROCEDURE — 85025 COMPLETE CBC W/AUTO DIFF WBC: CPT | Performed by: NURSE PRACTITIONER

## 2022-04-13 ENCOUNTER — TRANSCRIBE ORDERS (OUTPATIENT)
Dept: ADMINISTRATIVE | Facility: HOSPITAL | Age: 42
End: 2022-04-13

## 2022-04-13 ENCOUNTER — LAB (OUTPATIENT)
Dept: LAB | Facility: HOSPITAL | Age: 42
End: 2022-04-13

## 2022-04-13 DIAGNOSIS — N17.9 ACUTE RENAL FAILURE, UNSPECIFIED ACUTE RENAL FAILURE TYPE: ICD-10-CM

## 2022-04-13 DIAGNOSIS — D75.1 POLYCYTHEMIA: ICD-10-CM

## 2022-04-13 DIAGNOSIS — N17.9 ACUTE RENAL FAILURE, UNSPECIFIED ACUTE RENAL FAILURE TYPE: Primary | ICD-10-CM

## 2022-04-13 PROCEDURE — 36415 COLL VENOUS BLD VENIPUNCTURE: CPT

## 2022-04-13 PROCEDURE — 81001 URINALYSIS AUTO W/SCOPE: CPT

## 2022-04-13 PROCEDURE — 82570 ASSAY OF URINE CREATININE: CPT

## 2022-04-13 PROCEDURE — 84156 ASSAY OF PROTEIN URINE: CPT

## 2022-04-13 PROCEDURE — 80053 COMPREHEN METABOLIC PANEL: CPT

## 2022-04-13 PROCEDURE — 82043 UR ALBUMIN QUANTITATIVE: CPT

## 2022-04-14 LAB
ALBUMIN SERPL-MCNC: 4.4 G/DL (ref 3.5–5.2)
ALBUMIN UR-MCNC: <1.2 MG/DL
ALBUMIN/GLOB SERPL: 2 G/DL
ALP SERPL-CCNC: 71 U/L (ref 39–117)
ALT SERPL W P-5'-P-CCNC: 21 U/L (ref 1–33)
ANION GAP SERPL CALCULATED.3IONS-SCNC: 12.9 MMOL/L (ref 5–15)
AST SERPL-CCNC: 20 U/L (ref 1–32)
BACTERIA UR QL AUTO: ABNORMAL /HPF
BILIRUB SERPL-MCNC: 0.7 MG/DL (ref 0–1.2)
BILIRUB UR QL STRIP: NEGATIVE
BUN SERPL-MCNC: 11 MG/DL (ref 6–20)
BUN/CREAT SERPL: 12.1 (ref 7–25)
CALCIUM SPEC-SCNC: 9.7 MG/DL (ref 8.6–10.5)
CHLORIDE SERPL-SCNC: 106 MMOL/L (ref 98–107)
CLARITY UR: ABNORMAL
CO2 SERPL-SCNC: 21.1 MMOL/L (ref 22–29)
COLOR UR: YELLOW
CREAT SERPL-MCNC: 0.91 MG/DL (ref 0.57–1)
CREAT UR-MCNC: 168.4 MG/DL
CREAT UR-MCNC: 168.4 MG/DL
EGFRCR SERPLBLD CKD-EPI 2021: 79.9 ML/MIN/1.73
GLOBULIN UR ELPH-MCNC: 2.2 GM/DL
GLUCOSE SERPL-MCNC: 76 MG/DL (ref 65–99)
GLUCOSE UR STRIP-MCNC: NEGATIVE MG/DL
HGB UR QL STRIP.AUTO: NEGATIVE
HYALINE CASTS UR QL AUTO: ABNORMAL /LPF
KETONES UR QL STRIP: NEGATIVE
LEUKOCYTE ESTERASE UR QL STRIP.AUTO: ABNORMAL
MICROALBUMIN/CREAT UR: NORMAL MG/G{CREAT}
NITRITE UR QL STRIP: POSITIVE
PH UR STRIP.AUTO: 7 [PH] (ref 5–8)
POTASSIUM SERPL-SCNC: 4.1 MMOL/L (ref 3.5–5.2)
PROT ?TM UR-MCNC: 10.1 MG/DL
PROT SERPL-MCNC: 6.6 G/DL (ref 6–8.5)
PROT UR QL STRIP: ABNORMAL
PROT/CREAT UR: 60 MG/G CREA (ref 0–200)
RBC # UR STRIP: ABNORMAL /HPF
REF LAB TEST METHOD: ABNORMAL
SODIUM SERPL-SCNC: 140 MMOL/L (ref 136–145)
SP GR UR STRIP: 1.02 (ref 1–1.03)
SQUAMOUS #/AREA URNS HPF: ABNORMAL /HPF
URATE CRY URNS QL MICRO: ABNORMAL /HPF
UROBILINOGEN UR QL STRIP: ABNORMAL
WBC # UR STRIP: ABNORMAL /HPF

## 2022-04-21 ENCOUNTER — OFFICE VISIT (OUTPATIENT)
Dept: ONCOLOGY | Facility: CLINIC | Age: 42
End: 2022-04-21

## 2022-04-21 VITALS
DIASTOLIC BLOOD PRESSURE: 88 MMHG | HEART RATE: 86 BPM | SYSTOLIC BLOOD PRESSURE: 122 MMHG | WEIGHT: 175.8 LBS | HEIGHT: 63 IN | RESPIRATION RATE: 18 BRPM | BODY MASS INDEX: 31.15 KG/M2 | OXYGEN SATURATION: 98 % | TEMPERATURE: 97.8 F

## 2022-04-21 DIAGNOSIS — D75.1 POLYCYTHEMIA: Primary | ICD-10-CM

## 2022-04-21 LAB
BASOPHILS # BLD AUTO: 0.07 10*3/MM3 (ref 0–0.2)
BASOPHILS NFR BLD AUTO: 0.7 % (ref 0–1.5)
DEPRECATED RDW RBC AUTO: 53.1 FL (ref 37–54)
EOSINOPHIL # BLD AUTO: 0.15 10*3/MM3 (ref 0–0.4)
EOSINOPHIL NFR BLD AUTO: 1.5 % (ref 0.3–6.2)
ERYTHROCYTE [DISTWIDTH] IN BLOOD BY AUTOMATED COUNT: 15.2 % (ref 12.3–15.4)
HCT VFR BLD AUTO: 52 % (ref 34–46.6)
HGB BLD-MCNC: 17.2 G/DL (ref 12–15.9)
IMM GRANULOCYTES # BLD AUTO: 0.03 10*3/MM3 (ref 0–0.05)
IMM GRANULOCYTES NFR BLD AUTO: 0.3 % (ref 0–0.5)
LYMPHOCYTES # BLD AUTO: 2.86 10*3/MM3 (ref 0.7–3.1)
LYMPHOCYTES NFR BLD AUTO: 28.1 % (ref 19.6–45.3)
MCH RBC QN AUTO: 31 PG (ref 26.6–33)
MCHC RBC AUTO-ENTMCNC: 33.1 G/DL (ref 31.5–35.7)
MCV RBC AUTO: 93.9 FL (ref 79–97)
MONOCYTES # BLD AUTO: 0.58 10*3/MM3 (ref 0.1–0.9)
MONOCYTES NFR BLD AUTO: 5.7 % (ref 5–12)
NEUTROPHILS NFR BLD AUTO: 6.49 10*3/MM3 (ref 1.7–7)
NEUTROPHILS NFR BLD AUTO: 63.7 % (ref 42.7–76)
NRBC BLD AUTO-RTO: 0 /100 WBC (ref 0–0.2)
PLATELET # BLD AUTO: 342 10*3/MM3 (ref 140–450)
PMV BLD AUTO: 9.6 FL (ref 6–12)
RBC # BLD AUTO: 5.54 10*6/MM3 (ref 3.77–5.28)
WBC NRBC COR # BLD: 10.18 10*3/MM3 (ref 3.4–10.8)

## 2022-04-21 PROCEDURE — 99213 OFFICE O/P EST LOW 20 MIN: CPT | Performed by: NURSE PRACTITIONER

## 2022-04-21 PROCEDURE — 85025 COMPLETE CBC W/AUTO DIFF WBC: CPT

## 2022-04-21 RX ORDER — SODIUM BICARBONATE 325 MG/1
325 TABLET ORAL 4 TIMES DAILY
COMMUNITY

## 2022-04-21 NOTE — PROGRESS NOTES
"DATE:  4/21/2022    REASON FOR FOLLOW UP: Polycythemia    REFERRING PHYSICIAN:  ELSA Russell    CHIEF COMPLAINT:  Follow up of polycythemia    HISTORY OF PRESENT ILLNESS:   Loida Glasgow is a  42 y.o. female , known to us, who is being seen today at the request of ELSA Russell  for evaluation and treatment of polycythemia. Ms. Glasgow has been evaluated by Dr. Messina and myself in the past for the above issue. However, she has been noncompliant with follow up and was last seen in December 2017. Previous workup in 2016 and 2017 was  negative for JAK2 V167F mutation and LUCIO 2 Exon 12 mutation. Erythropoietin level was also normal. Abdominal US revealed no splenomegaly. Subsequently checked MPL and CALR mutation analysis which were negative. BCR/ABL by PCR also negative. Therefore, workup was suggestive of secondary polycythemia (due to chronic heavy smoking).PFTs were ordered  (given that she is a chronic smoker) which she reports she could not complete due to anxiety. CXR revealed no abnormalities in January 2016. Recommended formal sleep study but she declined. Since her last visit, she has continued to smoking ~1-2 PPD which she says is better. \"I used to smoke like a freSazze train.\" She has continued to follow with her PCP routinely with blood testing and Hct has remained elevated, ranging ~48-51%. At present, overall, she reports feeling well. She complains of occasional dry cough and headaches. Denies any focal weakness or dizziness. She says she is a very anxious person. She denies any other complaints today.     INTERVAL HISTORY:  Ms. Glasgow presents today for follow up. Since her last visit, she says she has really been trying to take better care of herself. She reports cutting back on smoking, currently down to ~1/2 PPD. She reports making healthier food choices and has lost ~20 lbs which makes her happy. She denies shortness of breath or cough. She reports feeling wonderful today " "with no specific complaints. Happily, she has not required phlebotomy since January 2021.     PAST MEDICAL HISTORY:  History reviewed. No pertinent past medical history.    PAST SURGICAL HISTORY:  Past Surgical History:   Procedure Laterality Date   • TUBAL ABDOMINAL LIGATION  2001     FAMILY HISTORY:  History reviewed. No pertinent family history.    SOCIAL HISTORY:  Social History     Socioeconomic History   • Marital status: Single   Tobacco Use   • Smoking status: Current Every Day Smoker     Packs/day: 2.00     Years: 17.00     Pack years: 34.00     Types: Cigarettes   • Smokeless tobacco: Never Used   Vaping Use   • Vaping Use: Never used   Substance and Sexual Activity   • Alcohol use: No   • Drug use: No   • Sexual activity: Defer     MEDICATIONS:  The current medication list was reviewed in the EMR    Current Outpatient Medications:   •  famotidine (PEPCID) 20 MG tablet, Take 20 mg by mouth 2 (Two) Times a Day., Disp: , Rfl:   •  simvastatin (ZOCOR) 20 MG tablet, Take 20 mg by mouth Every Night., Disp: , Rfl:   •  sodium bicarbonate 325 MG tablet, Take 325 mg by mouth 4 (Four) Times a Day., Disp: , Rfl:     ALLERGIES:    Allergies   Allergen Reactions   • Amoxicillin Other (See Comments)     Childhood allergy   • Penicillins Other (See Comments)     Childhood  allergy       REVIEW OF SYSTEMS:    A comprehensive 14 point review of systems was performed.  Significant findings as mentioned above.  All other systems reviewed and are negative.      Physical Exam   Vital Signs: /88   Pulse 86   Temp 97.8 °F (36.6 °C) (Temporal)   Resp 18   Ht 160 cm (63\")   Wt 79.7 kg (175 lb 12.8 oz)   SpO2 98%   BMI 31.14 kg/m²    General: Well developed, well nourished, alert and oriented x 3, in no acute distress.  Head: ATNC   Eyes: PERRL, No evidence of conjunctivitis.   Nose: No nasal discharge.   Mouth: Oral mucosal membranes moist. No oral ulceration or hemorrhages.   Neck: Neck supple. No thyromegaly. No " JVD.   Lungs: CTAB, no wheezing  Heart: Regular rate and rhythm. No murmurs, rubs, or gallops.   Abdomen: Soft. Bowel sounds are normoactive. Nontender with palpation.   Extremities: No cyanosis or edema.  Neurologic: Grossly non-focal exam    Pain Score:  Pain Score    04/21/22 1402   PainSc: 0-No pain     IMAGING:  CXR 1/26/21  FINDINGS:    Lungs:  Unremarkable.  No consolidation.    Pleural space:  Unremarkable.  No pneumothorax.    Heart:  Unremarkable.  No cardiomegaly.    Mediastinum:  Unremarkable.    Bones/joints:  Unremarkable.     IMPRESSION:    No acute findings in the chest.            RECENT LABS:  Lab Results   Component Value Date    WBC 10.18 04/21/2022    HGB 17.2 (H) 04/21/2022    HCT 52.0 (H) 04/21/2022    MCV 93.9 04/21/2022    RDW 15.2 04/21/2022     04/21/2022    NEUTRORELPCT 63.7 04/21/2022    LYMPHORELPCT 28.1 04/21/2022    MONORELPCT 5.7 04/21/2022    EOSRELPCT 1.5 04/21/2022    BASORELPCT 0.7 04/21/2022    NEUTROABS 6.49 04/21/2022    LYMPHSABS 2.86 04/21/2022       Lab Results   Component Value Date     04/13/2022    K 4.1 04/13/2022    CO2 21.1 (L) 04/13/2022     04/13/2022    BUN 11 04/13/2022    CREATININE 0.91 04/13/2022    EGFRIFNONA 61 04/05/2021    GLUCOSE 76 04/13/2022    CALCIUM 9.7 04/13/2022    ALKPHOS 71 04/13/2022    AST 20 04/13/2022    ALT 21 04/13/2022    BILITOT 0.7 04/13/2022    ALBUMIN 4.40 04/13/2022    PROTEINTOT 6.6 04/13/2022         ASSESSMENT & PLAN:  Loida Glasgow is a very pleasant 42 y.o. female with    1.  Polycythemia, likely secondary:  -Ms. Glasgow has been evaluated by Dr. Messina and myself in the past for the above issue. She was previously noncompliant but is now following as advised. Previous workup in 2016 and 2017 was  negative for JAK2 V167F mutation and LUCIO 2 Exon 12 mutation. Erythropoietin level was also normal. Abdominal US revealed no splenomegaly. Subsequently checked MPL and CALR mutation analysis which were negative.  "BCR/ABL by PCR also negative.  - Therefore, workup was suggestive of secondary polycythemia (due to chronic heavy smoking).PFTs were ordered which she reports she could not complete due to anxiety. CXR revealed no abnormalities in January 2016. Recommended formal sleep study but she declined.   -When she represented to our clinic, rechecked erythropoietin level which was normal. Given continued smoking, recommended repeat CXR which was done and again unremarkable (see above). Again, recommended sleep study but she declined.   -We have continued to monitor and happily, she has not required phlebotomy since 1/26/21. Since her last visit, she has cut back on smoking, now down to ~1/2 PPD.   -Repeat CBC today showed Hct of 52.0; However, she reports feeling \"wonderful\" and declined phlebotomy today.   -Will plan to follow up in 6 months with repeat CBC and prn phlebotomy to keep Hct <52%. In the meantime, she will continue to follow with PCP as previously planned and was strongly advised to quit smoking.     2. Leukocytosis:  -This has been intermittent and low grade. Likely reactive and secondary to chronic heavy smoking. She is without concerning B symptoms. CBC from today showed normalized WBC.  Will monitor.     ACO / SWAPNIL/Other  Quality measures  -  Loida Glasgow did not receive 2021 flu vaccine. She declined. Declined COVID vaccine.   -  Loida Glasgow reports a pain score of 0.    -  Current outpatient and discharge medications have been reconciled for the patient.  Reviewed by: ELSA Maki      The patient was in agreement with the plan and all questions were answered to her satisfaction.     Thank you so much for allowing us to participate in the care of Loida Glasgow . Please do not hesitate to contact us with any questions or concerns.     A total of 25 minutes were spent coordinating this patient’s care in clinic today; more than 50% of this time was face-to-face with the patient, " reviewing her medical history and counseling on the current treatment and followup plan. All questions were answered to her satisfaction.      Electronically Signed by: ELSA Maki , April 21, 2022 14:41 EDT     CC:     Andree Morelos APRN

## 2022-09-14 ENCOUNTER — LAB (OUTPATIENT)
Dept: LAB | Facility: HOSPITAL | Age: 42
End: 2022-09-14

## 2022-09-14 ENCOUNTER — TRANSCRIBE ORDERS (OUTPATIENT)
Dept: ADMINISTRATIVE | Facility: HOSPITAL | Age: 42
End: 2022-09-14

## 2022-09-14 DIAGNOSIS — D75.1 POLYCYTHEMIA: ICD-10-CM

## 2022-09-14 DIAGNOSIS — N18.9 CHRONIC KIDNEY DISEASE, UNSPECIFIED CKD STAGE: Primary | ICD-10-CM

## 2022-09-14 DIAGNOSIS — N18.9 CHRONIC KIDNEY DISEASE, UNSPECIFIED CKD STAGE: ICD-10-CM

## 2022-09-14 LAB
ALBUMIN SERPL-MCNC: 4.1 G/DL (ref 3.5–5.2)
ALBUMIN UR-MCNC: <1.2 MG/DL
ALBUMIN/GLOB SERPL: 1.7 G/DL
ALP SERPL-CCNC: 78 U/L (ref 39–117)
ALT SERPL W P-5'-P-CCNC: 23 U/L (ref 1–33)
ANION GAP SERPL CALCULATED.3IONS-SCNC: 12 MMOL/L (ref 5–15)
AST SERPL-CCNC: 17 U/L (ref 1–32)
BACTERIA UR QL AUTO: ABNORMAL /HPF
BASOPHILS # BLD AUTO: 0.09 10*3/MM3 (ref 0–0.2)
BASOPHILS NFR BLD AUTO: 1 % (ref 0–1.5)
BILIRUB SERPL-MCNC: 0.6 MG/DL (ref 0–1.2)
BILIRUB UR QL STRIP: NEGATIVE
BUN SERPL-MCNC: 10 MG/DL (ref 6–20)
BUN/CREAT SERPL: 9.3 (ref 7–25)
CALCIUM SPEC-SCNC: 9.7 MG/DL (ref 8.6–10.5)
CHLORIDE SERPL-SCNC: 105 MMOL/L (ref 98–107)
CLARITY UR: ABNORMAL
CO2 SERPL-SCNC: 23 MMOL/L (ref 22–29)
COLOR UR: YELLOW
CREAT SERPL-MCNC: 1.07 MG/DL (ref 0.57–1)
CREAT UR-MCNC: 130 MG/DL
CREAT UR-MCNC: 130 MG/DL
DEPRECATED RDW RBC AUTO: 52.7 FL (ref 37–54)
EGFRCR SERPLBLD CKD-EPI 2021: 65.8 ML/MIN/1.73
EOSINOPHIL # BLD AUTO: 0.16 10*3/MM3 (ref 0–0.4)
EOSINOPHIL NFR BLD AUTO: 1.7 % (ref 0.3–6.2)
ERYTHROCYTE [DISTWIDTH] IN BLOOD BY AUTOMATED COUNT: 15.3 % (ref 12.3–15.4)
GLOBULIN UR ELPH-MCNC: 2.4 GM/DL
GLUCOSE SERPL-MCNC: 80 MG/DL (ref 65–99)
GLUCOSE UR STRIP-MCNC: NEGATIVE MG/DL
HCT VFR BLD AUTO: 49.7 % (ref 34–46.6)
HGB BLD-MCNC: 16.7 G/DL (ref 12–15.9)
HGB UR QL STRIP.AUTO: NEGATIVE
HYALINE CASTS UR QL AUTO: ABNORMAL /LPF
IMM GRANULOCYTES # BLD AUTO: 0.03 10*3/MM3 (ref 0–0.05)
IMM GRANULOCYTES NFR BLD AUTO: 0.3 % (ref 0–0.5)
KETONES UR QL STRIP: NEGATIVE
LEUKOCYTE ESTERASE UR QL STRIP.AUTO: ABNORMAL
LYMPHOCYTES # BLD AUTO: 2.53 10*3/MM3 (ref 0.7–3.1)
LYMPHOCYTES NFR BLD AUTO: 26.9 % (ref 19.6–45.3)
MCH RBC QN AUTO: 31.6 PG (ref 26.6–33)
MCHC RBC AUTO-ENTMCNC: 33.6 G/DL (ref 31.5–35.7)
MCV RBC AUTO: 94 FL (ref 79–97)
MICROALBUMIN/CREAT UR: NORMAL MG/G{CREAT}
MONOCYTES # BLD AUTO: 0.54 10*3/MM3 (ref 0.1–0.9)
MONOCYTES NFR BLD AUTO: 5.7 % (ref 5–12)
NEUTROPHILS NFR BLD AUTO: 6.05 10*3/MM3 (ref 1.7–7)
NEUTROPHILS NFR BLD AUTO: 64.4 % (ref 42.7–76)
NITRITE UR QL STRIP: POSITIVE
NRBC BLD AUTO-RTO: 0 /100 WBC (ref 0–0.2)
PH UR STRIP.AUTO: 6 [PH] (ref 5–8)
PLATELET # BLD AUTO: 339 10*3/MM3 (ref 140–450)
PMV BLD AUTO: 9.5 FL (ref 6–12)
POTASSIUM SERPL-SCNC: 4.2 MMOL/L (ref 3.5–5.2)
PROT ?TM UR-MCNC: 11.8 MG/DL
PROT SERPL-MCNC: 6.5 G/DL (ref 6–8.5)
PROT UR QL STRIP: NEGATIVE
PROT/CREAT UR: 90.8 MG/G CREA (ref 0–200)
RBC # BLD AUTO: 5.29 10*6/MM3 (ref 3.77–5.28)
RBC # UR STRIP: ABNORMAL /HPF
REF LAB TEST METHOD: ABNORMAL
SODIUM SERPL-SCNC: 140 MMOL/L (ref 136–145)
SP GR UR STRIP: 1.02 (ref 1–1.03)
SQUAMOUS #/AREA URNS HPF: ABNORMAL /HPF
UNIDENT CRYS URNS QL MICRO: ABNORMAL /HPF
UROBILINOGEN UR QL STRIP: ABNORMAL
WBC # UR STRIP: ABNORMAL /HPF
WBC NRBC COR # BLD: 9.4 10*3/MM3 (ref 3.4–10.8)

## 2022-09-14 PROCEDURE — 85025 COMPLETE CBC W/AUTO DIFF WBC: CPT

## 2022-09-14 PROCEDURE — 80053 COMPREHEN METABOLIC PANEL: CPT

## 2022-09-14 PROCEDURE — 36415 COLL VENOUS BLD VENIPUNCTURE: CPT

## 2022-09-14 PROCEDURE — 84156 ASSAY OF PROTEIN URINE: CPT

## 2022-09-14 PROCEDURE — 82570 ASSAY OF URINE CREATININE: CPT

## 2022-09-14 PROCEDURE — 82043 UR ALBUMIN QUANTITATIVE: CPT

## 2022-09-14 PROCEDURE — 81001 URINALYSIS AUTO W/SCOPE: CPT

## 2022-10-24 ENCOUNTER — OFFICE VISIT (OUTPATIENT)
Dept: ONCOLOGY | Facility: CLINIC | Age: 42
End: 2022-10-24

## 2022-10-24 VITALS
OXYGEN SATURATION: 97 % | SYSTOLIC BLOOD PRESSURE: 132 MMHG | RESPIRATION RATE: 18 BRPM | BODY MASS INDEX: 30.4 KG/M2 | TEMPERATURE: 98 F | WEIGHT: 171.6 LBS | DIASTOLIC BLOOD PRESSURE: 90 MMHG | HEART RATE: 87 BPM

## 2022-10-24 DIAGNOSIS — D75.1 POLYCYTHEMIA: Primary | ICD-10-CM

## 2022-10-24 DIAGNOSIS — D72.829 LEUKOCYTOSIS, UNSPECIFIED TYPE: ICD-10-CM

## 2022-10-24 PROCEDURE — 85025 COMPLETE CBC W/AUTO DIFF WBC: CPT | Performed by: NURSE PRACTITIONER

## 2022-10-24 PROCEDURE — 99213 OFFICE O/P EST LOW 20 MIN: CPT | Performed by: NURSE PRACTITIONER

## 2022-10-24 PROCEDURE — 80053 COMPREHEN METABOLIC PANEL: CPT | Performed by: NURSE PRACTITIONER

## 2022-10-24 NOTE — PROGRESS NOTES
"DATE:  10/24/2022    REASON FOR FOLLOW UP: Polycythemia    REFERRING PHYSICIAN:  ELSA Russell    CHIEF COMPLAINT:  Follow up of polycythemia    HISTORY OF PRESENT ILLNESS:   Loida Glasgow is a  42 y.o. female , known to us, who is being seen today at the request of ELSA Russell  for evaluation and treatment of polycythemia. Ms. Glasgow has been evaluated by Dr. Messina and myself in the past for the above issue. However, she has been noncompliant with follow up and was last seen in December 2017. Previous workup in 2016 and 2017 was  negative for JAK2 V167F mutation and LUCIO 2 Exon 12 mutation. Erythropoietin level was also normal. Abdominal US revealed no splenomegaly. Subsequently checked MPL and CALR mutation analysis which were negative. BCR/ABL by PCR also negative. Therefore, workup was suggestive of secondary polycythemia (due to chronic heavy smoking).PFTs were ordered  (given that she is a chronic smoker) which she reports she could not complete due to anxiety. CXR revealed no abnormalities in January 2016. Recommended formal sleep study but she declined. Since her last visit, she has continued to smoking ~1-2 PPD which she says is better. \"I used to smoke like a Cubicle train.\" She has continued to follow with her PCP routinely with blood testing and Hct has remained elevated, ranging ~48-51%. At present, overall, she reports feeling well. She complains of occasional dry cough and headaches. Denies any focal weakness or dizziness. She says she is a very anxious person. She denies any other complaints today.     INTERVAL HISTORY:  Ms. Glasgow presents today for follow up. Since her last visit, she reports she has been doing well. Unfortunately, she continues to smoke and more recently increased to ~1.5 PPD. She knows she needs to cut back/quit but she says this is very difficult for her. She reports feeling wonderful today and has no specific complaints. Happily, she has not required " phlebotomy since January 2021.      PAST MEDICAL HISTORY:  History reviewed. No pertinent past medical history.    PAST SURGICAL HISTORY:  Past Surgical History:   Procedure Laterality Date   • TUBAL ABDOMINAL LIGATION  2001     FAMILY HISTORY:  History reviewed. No pertinent family history.    SOCIAL HISTORY:  Social History     Socioeconomic History   • Marital status: Single   Tobacco Use   • Smoking status: Every Day     Packs/day: 2.00     Years: 17.00     Pack years: 34.00     Types: Cigarettes   • Smokeless tobacco: Never   Vaping Use   • Vaping Use: Never used   Substance and Sexual Activity   • Alcohol use: No   • Drug use: No   • Sexual activity: Defer     MEDICATIONS:  The current medication list was reviewed in the EMR    Current Outpatient Medications:   •  famotidine (PEPCID) 20 MG tablet, Take 20 mg by mouth 2 (Two) Times a Day., Disp: , Rfl:   •  simvastatin (ZOCOR) 20 MG tablet, Take 20 mg by mouth Every Night., Disp: , Rfl:   •  sodium bicarbonate 325 MG tablet, Take 325 mg by mouth 4 (Four) Times a Day., Disp: , Rfl:     ALLERGIES:    Allergies   Allergen Reactions   • Amoxicillin Other (See Comments)     Childhood allergy   • Penicillins Other (See Comments)     Childhood  allergy       REVIEW OF SYSTEMS:    A comprehensive 14 point review of systems was performed.  Significant findings as mentioned above.  All other systems reviewed and are negative.      Physical Exam   Vital Signs: /90   Pulse 87   Temp 98 °F (36.7 °C) (Temporal)   Resp 18   Wt 77.8 kg (171 lb 9.6 oz)   SpO2 97%   BMI 30.40 kg/m²    General: Well developed, well nourished, alert and oriented x 3, in no acute distress.  Head: ATNC   Eyes: PERRL, No evidence of conjunctivitis.   Nose: No nasal discharge.   Mouth: Oral mucosal membranes moist. No oral ulceration or hemorrhages.   Neck: Neck supple. No thyromegaly. No JVD.   Lungs: CTAB, no wheezing  Heart: RRR. No murmurs, rubs, or gallops.   Abdomen: Soft. Bowel  sounds are normoactive. Nontender with palpation.   Extremities: No cyanosis or edema.  Neurologic: Grossly non-focal exam    Pain Score:  Pain Score    10/24/22 1316   PainSc: 0-No pain     IMAGING:  CXR 1/26/21  FINDINGS:    Lungs:  Unremarkable.  No consolidation.    Pleural space:  Unremarkable.  No pneumothorax.    Heart:  Unremarkable.  No cardiomegaly.    Mediastinum:  Unremarkable.    Bones/joints:  Unremarkable.     IMPRESSION:    No acute findings in the chest.            RECENT LABS:  Lab Results   Component Value Date    WBC 9.56 10/24/2022    HGB 16.3 (H) 10/24/2022    HCT 49.0 (H) 10/24/2022    MCV 93.7 10/24/2022    RDW 15.0 10/24/2022     10/24/2022    NEUTRORELPCT 62.9 10/24/2022    LYMPHORELPCT 28.2 10/24/2022    MONORELPCT 6.1 10/24/2022    EOSRELPCT 1.8 10/24/2022    BASORELPCT 0.6 10/24/2022    NEUTROABS 6.01 10/24/2022    LYMPHSABS 2.70 10/24/2022       Lab Results   Component Value Date     10/24/2022    K 4.1 10/24/2022    CO2 25.8 10/24/2022     10/24/2022    BUN 8 10/24/2022    CREATININE 1.06 (H) 10/24/2022    EGFRIFNONA 61 04/05/2021    GLUCOSE 89 10/24/2022    CALCIUM 9.2 10/24/2022    ALKPHOS 83 10/24/2022    AST 21 10/24/2022    ALT 26 10/24/2022    BILITOT 0.5 10/24/2022    ALBUMIN 4.21 10/24/2022    PROTEINTOT 6.8 10/24/2022         ASSESSMENT & PLAN:  Loida Glasgow is a very pleasant 42 y.o. female with    1.  Polycythemia, likely secondary:  -Ms. Glasgow has been evaluated by Dr. Messina and myself in the past for the above issue. She was previously noncompliant but is now following as advised. Previous workup in 2016 and 2017 was  negative for JAK2 V167F mutation and LUCIO 2 Exon 12 mutation. Erythropoietin level was also normal. Abdominal US revealed no splenomegaly. Subsequently checked MPL and CALR mutation analysis which were negative. BCR/ABL by PCR also negative.  - Therefore, workup was suggestive of secondary polycythemia (due to chronic heavy  smoking).PFTs were ordered which she reports she could not complete due to anxiety. CXR revealed no abnormalities in January 2016. Recommended formal sleep study but she declined.   -When she represented to our clinic, rechecked erythropoietin level which was normal. Given continued smoking, recommended repeat CXR which was done and again unremarkable (see above). Again, recommended sleep study but she declined.   -We have continued to monitor and happily, she has not required phlebotomy since 1/26/21. Unfortunately, she continues to smoke, currently ~1.5 PPD which she has increased from previous visit. She understands the importance of cutting back/quitting but says this is difficult for her and she is not ready at this time.  -Repeat CBC today showed Hct of 49. Therefore, phlebotomy is not currently indicated.   -Will plan to check CBC in 6 months with prn phlebotomy. Will follow up in 1 year with labs.   - In the meantime, she will continue to follow with PCP as previously planned and was strongly advised to quit smoking.     2. Leukocytosis:  -This has been chronic/intermittent and low grade. Likely reactive and secondary to chronic heavy smoking. She is without concerning B symptoms. CBC from today shows normal WBC.  Will monitor.     ACO / SWAPNIL/Other  Quality measures  -  Loida Glasgow did not receive 2022 flu vaccine. She declines. Declined COVID vaccine.   -  Loida Glasgow reports a pain score of 0.    -  Current outpatient and discharge medications have been reconciled for the patient.  Reviewed by: ELSA Maki      The patient was in agreement with the plan and all questions were answered to her satisfaction.     Thank you so much for allowing us to participate in the care of Loida Glasgow . Please do not hesitate to contact us with any questions or concerns.     A total of 25 minutes were spent coordinating this patient’s care in clinic today; more than 50% of this time was  face-to-face with the patient, reviewing her medical history and counseling on the current treatment and followup plan. All questions were answered to her satisfaction.      Electronically Signed by: ELSA Maki , October 24, 2022 13:34 EDT     CC:     Andree Morelos APRN

## 2023-01-16 ENCOUNTER — TELEPHONE (OUTPATIENT)
Dept: ONCOLOGY | Facility: CLINIC | Age: 43
End: 2023-01-16

## 2023-01-16 NOTE — TELEPHONE ENCOUNTER
Caller: Loida Glasgow    Relationship: Self    Best call back number: [phone]    What is the best time to reach you: ASAP    Who are you requesting to speak with (clinical staff, provider,  specific staff member): SCHEDULING    What was the call regarding: PT REC'D LETTER THAT [facility] WILL BE LEAVING, SHE WANTS TO RESCHEDULE HER APPT. WITH DR WHITE, PLEASE CALL PT BACK TO ADVISE/SCHEDULE.    Do you require a callback: YES

## 2023-03-22 ENCOUNTER — TRANSCRIBE ORDERS (OUTPATIENT)
Dept: ADMINISTRATIVE | Facility: HOSPITAL | Age: 43
End: 2023-03-22
Payer: COMMERCIAL

## 2023-03-22 ENCOUNTER — LAB (OUTPATIENT)
Dept: LAB | Facility: HOSPITAL | Age: 43
End: 2023-03-22
Payer: COMMERCIAL

## 2023-03-22 DIAGNOSIS — N18.9 CHRONIC KIDNEY DISEASE, UNSPECIFIED CKD STAGE: ICD-10-CM

## 2023-03-22 DIAGNOSIS — N18.9 CHRONIC KIDNEY DISEASE, UNSPECIFIED CKD STAGE: Primary | ICD-10-CM

## 2023-03-22 PROCEDURE — 81001 URINALYSIS AUTO W/SCOPE: CPT

## 2023-03-22 PROCEDURE — 84156 ASSAY OF PROTEIN URINE: CPT

## 2023-03-22 PROCEDURE — 36415 COLL VENOUS BLD VENIPUNCTURE: CPT

## 2023-03-22 PROCEDURE — 82043 UR ALBUMIN QUANTITATIVE: CPT

## 2023-03-22 PROCEDURE — 80053 COMPREHEN METABOLIC PANEL: CPT

## 2023-03-22 PROCEDURE — 82570 ASSAY OF URINE CREATININE: CPT

## 2023-03-23 LAB
ALBUMIN SERPL-MCNC: 3.9 G/DL (ref 3.5–5.2)
ALBUMIN UR-MCNC: 1.8 MG/DL
ALBUMIN/GLOB SERPL: 1.3 G/DL
ALP SERPL-CCNC: 70 U/L (ref 39–117)
ALT SERPL W P-5'-P-CCNC: 20 U/L (ref 1–33)
ANION GAP SERPL CALCULATED.3IONS-SCNC: 12.3 MMOL/L (ref 5–15)
AST SERPL-CCNC: 19 U/L (ref 1–32)
BACTERIA UR QL AUTO: ABNORMAL /HPF
BILIRUB SERPL-MCNC: 0.6 MG/DL (ref 0–1.2)
BILIRUB UR QL STRIP: NEGATIVE
BUN SERPL-MCNC: 12 MG/DL (ref 6–20)
BUN/CREAT SERPL: 12 (ref 7–25)
CALCIUM SPEC-SCNC: 9.6 MG/DL (ref 8.6–10.5)
CHLORIDE SERPL-SCNC: 105 MMOL/L (ref 98–107)
CLARITY UR: ABNORMAL
CO2 SERPL-SCNC: 24.7 MMOL/L (ref 22–29)
COLOR UR: YELLOW
CREAT SERPL-MCNC: 1 MG/DL (ref 0.57–1)
CREAT UR-MCNC: 171.1 MG/DL
CREAT UR-MCNC: 171.1 MG/DL
EGFRCR SERPLBLD CKD-EPI 2021: 71.4 ML/MIN/1.73
GLOBULIN UR ELPH-MCNC: 2.9 GM/DL
GLUCOSE SERPL-MCNC: 71 MG/DL (ref 65–99)
GLUCOSE UR STRIP-MCNC: NEGATIVE MG/DL
HGB UR QL STRIP.AUTO: NEGATIVE
HYALINE CASTS UR QL AUTO: ABNORMAL /LPF
KETONES UR QL STRIP: NEGATIVE
LEUKOCYTE ESTERASE UR QL STRIP.AUTO: ABNORMAL
MICROALBUMIN/CREAT UR: 10.5 MG/G
NITRITE UR QL STRIP: POSITIVE
PH UR STRIP.AUTO: 8.5 [PH] (ref 5–8)
POTASSIUM SERPL-SCNC: 3.9 MMOL/L (ref 3.5–5.2)
PROT ?TM UR-MCNC: 14 MG/DL
PROT SERPL-MCNC: 6.8 G/DL (ref 6–8.5)
PROT UR QL STRIP: ABNORMAL
PROT/CREAT UR: 81.8 MG/G CREA (ref 0–200)
RBC # UR STRIP: ABNORMAL /HPF
REF LAB TEST METHOD: ABNORMAL
SODIUM SERPL-SCNC: 142 MMOL/L (ref 136–145)
SP GR UR STRIP: 1.02 (ref 1–1.03)
SQUAMOUS #/AREA URNS HPF: ABNORMAL /HPF
UROBILINOGEN UR QL STRIP: ABNORMAL
WBC # UR STRIP: ABNORMAL /HPF

## 2023-04-24 ENCOUNTER — LAB (OUTPATIENT)
Dept: ONCOLOGY | Facility: HOSPITAL | Age: 43
End: 2023-04-24
Payer: COMMERCIAL

## 2023-04-24 VITALS
RESPIRATION RATE: 18 BRPM | TEMPERATURE: 98.4 F | SYSTOLIC BLOOD PRESSURE: 124 MMHG | DIASTOLIC BLOOD PRESSURE: 90 MMHG | HEART RATE: 100 BPM | OXYGEN SATURATION: 100 %

## 2023-04-24 DIAGNOSIS — D75.1 POLYCYTHEMIA: ICD-10-CM

## 2023-04-24 LAB
BASOPHILS # BLD AUTO: 0.09 10*3/MM3 (ref 0–0.2)
BASOPHILS NFR BLD AUTO: 0.9 % (ref 0–1.5)
DEPRECATED RDW RBC AUTO: 52.4 FL (ref 37–54)
EOSINOPHIL # BLD AUTO: 0.17 10*3/MM3 (ref 0–0.4)
EOSINOPHIL NFR BLD AUTO: 1.7 % (ref 0.3–6.2)
ERYTHROCYTE [DISTWIDTH] IN BLOOD BY AUTOMATED COUNT: 15.3 % (ref 12.3–15.4)
HCT VFR BLD AUTO: 51.7 % (ref 34–46.6)
HGB BLD-MCNC: 17.2 G/DL (ref 12–15.9)
IMM GRANULOCYTES # BLD AUTO: 0.03 10*3/MM3 (ref 0–0.05)
IMM GRANULOCYTES NFR BLD AUTO: 0.3 % (ref 0–0.5)
LYMPHOCYTES # BLD AUTO: 2.21 10*3/MM3 (ref 0.7–3.1)
LYMPHOCYTES NFR BLD AUTO: 22.5 % (ref 19.6–45.3)
MCH RBC QN AUTO: 31.2 PG (ref 26.6–33)
MCHC RBC AUTO-ENTMCNC: 33.3 G/DL (ref 31.5–35.7)
MCV RBC AUTO: 93.8 FL (ref 79–97)
MONOCYTES # BLD AUTO: 0.55 10*3/MM3 (ref 0.1–0.9)
MONOCYTES NFR BLD AUTO: 5.6 % (ref 5–12)
NEUTROPHILS NFR BLD AUTO: 6.78 10*3/MM3 (ref 1.7–7)
NEUTROPHILS NFR BLD AUTO: 69 % (ref 42.7–76)
NRBC BLD AUTO-RTO: 0 /100 WBC (ref 0–0.2)
PLATELET # BLD AUTO: 327 10*3/MM3 (ref 140–450)
PMV BLD AUTO: 9.6 FL (ref 6–12)
RBC # BLD AUTO: 5.51 10*6/MM3 (ref 3.77–5.28)
WBC NRBC COR # BLD: 9.83 10*3/MM3 (ref 3.4–10.8)

## 2023-04-24 PROCEDURE — 85025 COMPLETE CBC W/AUTO DIFF WBC: CPT

## 2023-04-27 ENCOUNTER — TELEPHONE (OUTPATIENT)
Dept: ONCOLOGY | Facility: CLINIC | Age: 43
End: 2023-04-27

## 2023-04-27 NOTE — TELEPHONE ENCOUNTER
Caller: Loida Glasgow    Relationship to patient: Self    Best call back number: [phone]    Chief complaint: R/S    Type of visit: LAB AND FOLLOW UP    Requested date: TUESDAY     If rescheduling, when is the original appointment: 10-25     Additional notes:PLEASE ADVISE

## 2023-10-20 ENCOUNTER — TRANSCRIBE ORDERS (OUTPATIENT)
Dept: ADMINISTRATIVE | Facility: HOSPITAL | Age: 43
End: 2023-10-20
Payer: COMMERCIAL

## 2023-10-20 ENCOUNTER — LAB (OUTPATIENT)
Dept: LAB | Facility: HOSPITAL | Age: 43
End: 2023-10-20
Payer: COMMERCIAL

## 2023-10-20 DIAGNOSIS — N18.9 CHRONIC KIDNEY DISEASE, UNSPECIFIED CKD STAGE: Primary | ICD-10-CM

## 2023-10-20 DIAGNOSIS — N18.9 CHRONIC KIDNEY DISEASE, UNSPECIFIED CKD STAGE: ICD-10-CM

## 2023-10-20 PROCEDURE — 82570 ASSAY OF URINE CREATININE: CPT

## 2023-10-20 PROCEDURE — 36415 COLL VENOUS BLD VENIPUNCTURE: CPT

## 2023-10-20 PROCEDURE — 80053 COMPREHEN METABOLIC PANEL: CPT

## 2023-10-20 PROCEDURE — 84156 ASSAY OF PROTEIN URINE: CPT

## 2023-10-20 PROCEDURE — 82043 UR ALBUMIN QUANTITATIVE: CPT

## 2023-10-20 PROCEDURE — 81001 URINALYSIS AUTO W/SCOPE: CPT

## 2023-10-21 LAB
ALBUMIN SERPL-MCNC: 4.2 G/DL (ref 3.5–5.2)
ALBUMIN UR-MCNC: <1.2 MG/DL
ALBUMIN/GLOB SERPL: 1.4 G/DL
ALP SERPL-CCNC: 73 U/L (ref 39–117)
ALT SERPL W P-5'-P-CCNC: 21 U/L (ref 1–33)
ANION GAP SERPL CALCULATED.3IONS-SCNC: 8.3 MMOL/L (ref 5–15)
AST SERPL-CCNC: 20 U/L (ref 1–32)
BACTERIA UR QL AUTO: ABNORMAL /HPF
BILIRUB SERPL-MCNC: 0.8 MG/DL (ref 0–1.2)
BILIRUB UR QL STRIP: NEGATIVE
BUN SERPL-MCNC: 10 MG/DL (ref 6–20)
BUN/CREAT SERPL: 9.5 (ref 7–25)
CALCIUM SPEC-SCNC: 9.6 MG/DL (ref 8.6–10.5)
CHLORIDE SERPL-SCNC: 104 MMOL/L (ref 98–107)
CLARITY UR: ABNORMAL
CO2 SERPL-SCNC: 27.7 MMOL/L (ref 22–29)
COLOR UR: YELLOW
CREAT SERPL-MCNC: 1.05 MG/DL (ref 0.57–1)
CREAT UR-MCNC: 156 MG/DL
CREAT UR-MCNC: 156 MG/DL
EGFRCR SERPLBLD CKD-EPI 2021: 66.9 ML/MIN/1.73
GLOBULIN UR ELPH-MCNC: 2.9 GM/DL
GLUCOSE SERPL-MCNC: 71 MG/DL (ref 65–99)
GLUCOSE UR STRIP-MCNC: NEGATIVE MG/DL
HGB UR QL STRIP.AUTO: NEGATIVE
HYALINE CASTS UR QL AUTO: ABNORMAL /LPF
KETONES UR QL STRIP: NEGATIVE
LEUKOCYTE ESTERASE UR QL STRIP.AUTO: ABNORMAL
MICROALBUMIN/CREAT UR: NORMAL MG/G{CREAT}
NITRITE UR QL STRIP: POSITIVE
PH UR STRIP.AUTO: 8 [PH] (ref 5–8)
POTASSIUM SERPL-SCNC: 4 MMOL/L (ref 3.5–5.2)
PROT ?TM UR-MCNC: 10.7 MG/DL
PROT SERPL-MCNC: 7.1 G/DL (ref 6–8.5)
PROT UR QL STRIP: ABNORMAL
PROT/CREAT UR: 68.6 MG/G CREA (ref 0–200)
RBC # UR STRIP: ABNORMAL /HPF
REF LAB TEST METHOD: ABNORMAL
SODIUM SERPL-SCNC: 140 MMOL/L (ref 136–145)
SP GR UR STRIP: 1.02 (ref 1–1.03)
SQUAMOUS #/AREA URNS HPF: ABNORMAL /HPF
TRI-PHOS CRY URNS QL MICRO: ABNORMAL /HPF
UROBILINOGEN UR QL STRIP: ABNORMAL
WBC # UR STRIP: ABNORMAL /HPF

## 2023-11-03 NOTE — PROGRESS NOTES
"Loida Glasgow  [id number]  1980  11/9/2023      Referring Provider:   ELSA Russell    Reason for Follow Up:   Polycythemia     Chief Complaint:  Polycythemia      History of Present Illness   Loida Glasgow is a  42 y.o. female, who is being seen today in follow up appointment at the request of ELSA Russell  for evaluation and treatment of polycythemia.     Ms. Glasgow has been evaluated by myself and ELSA. Previous workup in 2016 and 2017 was negative for JAK2 V167F mutation and LUCIO 2 Exon 12 mutation. Erythropoietin level was also normal. Abdominal US revealed no splenomegaly. Subsequently checked MPL and CALR mutation analysis which were negative. BCR/ABL by PCR also negative. Therefore, workup was suggestive of secondary polycythemia (due to chronic heavy smoking).PFTs were ordered  (given that she is a chronic smoker) which she reports she could not complete due to anxiety. CXR revealed no abnormalities in January 2016. Recommended formal sleep study but she declined. She has smoked ~1-2 ppd which she says is better. \"I used to smoke like a [employer] train.\" She has continued to follow with her PCP routinely with blood testing and Hct has remained elevated, ranging ~48-51%. At present, overall, she reports feeling well. She complains of occasional dry cough and headaches. Denies any focal weakness or dizziness. She says she is a very anxious person. She denies any other complaints today. She has had one phlebotomy and reports that she felt quite weak after.     INTERVAL HISTORY:  Ms. Glasgow presents today for follow up. Since her last visit, she reports she has been doing well. She continues to smoke and reports this is down to 1 ppd. She also reports weight gain since her last visit due to eating out. She denies of any significant complaints today. She has not required phlebotomy since January 2021 which made her feel very weak.            The following portions of the patient's " history were reviewed and updated as appropriate: allergies, current medications, past family history, past medical history, past social history, past surgical history and problem list.        Allergies   Allergen Reactions    Amoxicillin Other (See Comments)     Childhood allergy    Penicillins Other (See Comments)     Childhood  allergy       History reviewed. No pertinent past medical history.    Past Surgical History:   Procedure Laterality Date    TUBAL ABDOMINAL LIGATION  2001       Social History     Socioeconomic History    Marital status: Single   Tobacco Use    Smoking status: Every Day     Packs/day: 2.00     Years: 17.00     Additional pack years: 0.00     Total pack years: 34.00     Types: Cigarettes    Smokeless tobacco: Never   Vaping Use    Vaping Use: Never used   Substance and Sexual Activity    Alcohol use: No    Drug use: No    Sexual activity: Defer       History reviewed. No pertinent family history.          Current Outpatient Medications:     famotidine (PEPCID) 20 MG tablet, Take 1 tablet by mouth 2 (Two) Times a Day., Disp: , Rfl:     simvastatin (ZOCOR) 20 MG tablet, Take 1 tablet by mouth Every Night., Disp: , Rfl:     sodium bicarbonate 325 MG tablet, Take 1 tablet by mouth 4 (Four) Times a Day., Disp: , Rfl:         Review of Systems  Pertinent positives are listed as per history of present of illness, all other systems reviewed and are negative.        Physical Exam  Vital Signs: These were reviewed and listed as per patient’s electronic medical chart  Vitals:    11/09/23 1259   BP: 117/81   Pulse: 84   Resp: 18   Temp: 97.5 °F (36.4 °C)   SpO2: 97%     General: Awake, alert and oriented, in no distress  HEENT: Head is atraumatic, normocephalic, extraocular movements full, no scleral icterus  Neck: supple, no jvd, lymphadenopathy or masses  Cardiovascular: regular rate and rhythm without murmurs, rubs or gallops  Pulmonary: non-labored, clear to auscultation bilaterally, no  wheezing  Abdomen: soft, non-tender, non-distended, normal active bowel sounds present, no organomegaly  Extremities: No clubbing, cyanosis or edema  Lymph: No cervical, supraclavicular adenopathy  Neurologic: Mental status as above, alert, awake and oriented, grossly non-focal exam  Skin: warm, dry, intact        Pain Score:  Pain Score    11/09/23 1259   PainSc: 0-No pain           PHQ-Score Total:  PHQ-9 Total Score:          IMAGING:  US Abdomen Complete (09/15/2017 09:31)   FINDINGS:Visualized pancreas is unremarkable. The gallbladder is unremarkable without stones or wall thickening.The common bile duct measures 3.64486377823 mm.The liver demonstrates normal echogenicity and is without focal lesion.Spleen is not enlarged measuring 10.939432920439 cm in length without focal splenic abnormality. The right kidney measures  9.416329275143 cm in length and the left kidney measures 9.087988299420 cm in length without mass, hydronephrosis, or stone in either kidney. No ascites demonstrated. IVC shows patency.  There is normal directional flow within the portal vein. Partially visualized aorta appears grossly normal in caliber.  IMPRESSION:Negative abdominal ultrasound.         LABS/STUDIES:  Office Visit on 11/09/2023   Component Date Value    Iron 11/09/2023 107     Iron Saturation (TSAT) 11/09/2023 28     Transferrin 11/09/2023 256     TIBC 11/09/2023 381     C-Reactive Protein 11/09/2023 0.55 (H)     WBC 11/09/2023 10.90 (H)     RBC 11/09/2023 5.28     Hemoglobin 11/09/2023 16.4 (H)     Hematocrit 11/09/2023 50.7 (H)     MCV 11/09/2023 96.0     MCH 11/09/2023 31.1     MCHC 11/09/2023 32.3     RDW 11/09/2023 15.6 (H)     RDW-SD 11/09/2023 55.5 (H)     MPV 11/09/2023 9.4     Platelets 11/09/2023 335     Neutrophil % 11/09/2023 67.0     Lymphocyte % 11/09/2023 24.5     Monocyte % 11/09/2023 6.0     Eosinophil % 11/09/2023 1.3     Basophil % 11/09/2023 0.7     Immature Grans % 11/09/2023 0.5     Neutrophils, Absolute  11/09/2023 7.31 (H)     Lymphocytes, Absolute 11/09/2023 2.67     Monocytes, Absolute 11/09/2023 0.65     Eosinophils, Absolute 11/09/2023 0.14     Basophils, Absolute 11/09/2023 0.08     Immature Grans, Absolute 11/09/2023 0.05     nRBC 11/09/2023 0.0    Lab on 10/20/2023   Component Date Value    Protein/Creatinine Ratio* 10/20/2023 68.6     Creatinine, Urine 10/20/2023 156.0     Total Protein, Urine 10/20/2023 10.7     Microalbumin/Creatinine * 10/20/2023      Creatinine, Urine 10/20/2023 156.0     Microalbumin, Urine 10/20/2023 <1.2     Glucose 10/20/2023 71     BUN 10/20/2023 10     Creatinine 10/20/2023 1.05 (H)     Sodium 10/20/2023 140     Potassium 10/20/2023 4.0     Chloride 10/20/2023 104     CO2 10/20/2023 27.7     Calcium 10/20/2023 9.6     Total Protein 10/20/2023 7.1     Albumin 10/20/2023 4.2     ALT (SGPT) 10/20/2023 21     AST (SGOT) 10/20/2023 20     Alkaline Phosphatase 10/20/2023 73     Total Bilirubin 10/20/2023 0.8     Globulin 10/20/2023 2.9     A/G Ratio 10/20/2023 1.4     BUN/Creatinine Ratio 10/20/2023 9.5     Anion Gap 10/20/2023 8.3     eGFR 10/20/2023 66.9     Color, UA 10/20/2023 Yellow     Appearance, UA 10/20/2023 Turbid (A)     pH, UA 10/20/2023 8.0     Specific Gravity, UA 10/20/2023 1.018     Glucose, UA 10/20/2023 Negative     Ketones, UA 10/20/2023 Negative     Bilirubin, UA 10/20/2023 Negative     Blood, UA 10/20/2023 Negative     Protein, UA 10/20/2023 Trace (A)     Leuk Esterase, UA 10/20/2023 Trace (A)     Nitrite, UA 10/20/2023 Positive (A)     Urobilinogen, UA 10/20/2023 0.2 E.U./dL     RBC, UA 10/20/2023 None Seen     WBC, UA 10/20/2023 0-2     Bacteria, UA 10/20/2023 4+ (A)     Squamous Epithelial Cell* 10/20/2023 3-6 (A)     Hyaline Casts, UA 10/20/2023 None Seen     Triple Phosphate Crystal* 10/20/2023 Moderate/2+     Methodology 10/20/2023 Manual Light Microscopy          Assessment & Plan   Olidacleo Glasgow is a  42 y.o. female, who is being seen today in follow up  appointment at the request of ELSA Russell  for evaluation and treatment of polycythemia.     1. Secondary polycythemia  - Previous workup in 2016 and 2017 was negative for JAK2 V167F mutation and LUCIO 2 Exon 12 mutation. Erythropoietin level was also normal. Abdominal US revealed no splenomegaly. Subsequently checked MPL and CALR mutation analysis which were negative. BCR/ABL by PCR also negative.  - Therefore, workup was suggestive of secondary polycythemia (due to chronic heavy smoking). PFTs were ordered which she reports she could not complete due to anxiety. CXR revealed no abnormalities in January 2016. Recommended formal sleep study but she declined.   - She was thereafter lost to follow up. However when she represented to our clinic, rechecked erythropoietin level which was again normal. Given continued smoking, recommended repeat CXR which was done and again unremarkable (see above). Again, recommended sleep study but she declined.   - We have continued to monitor and happily, she has not required phlebotomy since 1/26/21 which she did not tolerate well. Unfortunately, she continues to smoke, currently 1ppd. She understands the importance of cutting back/quitting but says this is difficult for her and she does not want to at present but will continue to cut back.  - Given the stability in her labs she can continue to follow with her PCP for routine lab monitoring. She does not require phlebotomy today and would recommend phlebotomy for Hct >55.     2. Leukocytosis:  -This has been chronic/intermittent and low grade. Likely reactive and secondary to chronic heavy smoking as well as weight gain. She is without concerning B symptoms. CBC from today shows normal WBC. This can be monitored by her PCP every 6-12 months.     ACO / SWAPNIL/Other  Quality measures  -  Loida Glasgow  reports that she has been smoking cigarettes. She has a 34.00 pack-year smoking history. She has never used smokeless tobacco. I  have educated her on the risk of diseases from using tobacco products such as cancer, COPD, and heart disease. I advised her to quit and she is not willing to quit. I spent 3  minutes counseling the patient. Decreased to 1ppd  -  Loida Glasgow did not receive 2023 flu vaccine.  This was recommended but declined.  -  Loida Glasgow reports a pain score of 0.  Given her pain assessment as noted, treatment options were discussed and the following options were decided upon as a follow-up plan to address the patient's pain: continuation of current treatment plan for pain.  -  Current outpatient and discharge medications have been reconciled for the patient.  Reviewed by: Gloria Messina MD      I will have the patient return in follow up appointment on an as needed basis. She understands that should she have any concerns in the future, she should give us a call at any time and I would be happy to re-evaluate her. It was a pleasure to see this patient in clinic today, thank you for allowing me to participate in the care of this patient.    I spent a total of 30 minutes in direct patient care, greater than 50% of the time was spent in coordination of care, and counseling the patient regarding diagnosis and treatment. I answered any questions patient had to their satisfaction.    Gloria Messina MD   11/09/23   13:41 EST

## 2023-11-09 ENCOUNTER — LAB (OUTPATIENT)
Dept: ONCOLOGY | Facility: CLINIC | Age: 43
End: 2023-11-09
Payer: COMMERCIAL

## 2023-11-09 ENCOUNTER — OFFICE VISIT (OUTPATIENT)
Dept: ONCOLOGY | Facility: CLINIC | Age: 43
End: 2023-11-09
Payer: COMMERCIAL

## 2023-11-09 VITALS
SYSTOLIC BLOOD PRESSURE: 117 MMHG | RESPIRATION RATE: 18 BRPM | HEIGHT: 63 IN | WEIGHT: 180 LBS | OXYGEN SATURATION: 97 % | TEMPERATURE: 97.5 F | BODY MASS INDEX: 31.89 KG/M2 | DIASTOLIC BLOOD PRESSURE: 81 MMHG | HEART RATE: 84 BPM

## 2023-11-09 DIAGNOSIS — D72.829 LEUKOCYTOSIS, UNSPECIFIED TYPE: ICD-10-CM

## 2023-11-09 DIAGNOSIS — D75.1 POLYCYTHEMIA: Primary | ICD-10-CM

## 2023-11-09 LAB
BASOPHILS # BLD AUTO: 0.08 10*3/MM3 (ref 0–0.2)
BASOPHILS NFR BLD AUTO: 0.7 % (ref 0–1.5)
CRP SERPL-MCNC: 0.55 MG/DL (ref 0–0.5)
DEPRECATED RDW RBC AUTO: 55.5 FL (ref 37–54)
EOSINOPHIL # BLD AUTO: 0.14 10*3/MM3 (ref 0–0.4)
EOSINOPHIL NFR BLD AUTO: 1.3 % (ref 0.3–6.2)
ERYTHROCYTE [DISTWIDTH] IN BLOOD BY AUTOMATED COUNT: 15.6 % (ref 12.3–15.4)
HCT VFR BLD AUTO: 50.7 % (ref 34–46.6)
HGB BLD-MCNC: 16.4 G/DL (ref 12–15.9)
IMM GRANULOCYTES # BLD AUTO: 0.05 10*3/MM3 (ref 0–0.05)
IMM GRANULOCYTES NFR BLD AUTO: 0.5 % (ref 0–0.5)
IRON 24H UR-MRATE: 107 MCG/DL (ref 37–145)
IRON SATN MFR SERPL: 28 % (ref 20–50)
LYMPHOCYTES # BLD AUTO: 2.67 10*3/MM3 (ref 0.7–3.1)
LYMPHOCYTES NFR BLD AUTO: 24.5 % (ref 19.6–45.3)
MCH RBC QN AUTO: 31.1 PG (ref 26.6–33)
MCHC RBC AUTO-ENTMCNC: 32.3 G/DL (ref 31.5–35.7)
MCV RBC AUTO: 96 FL (ref 79–97)
MONOCYTES # BLD AUTO: 0.65 10*3/MM3 (ref 0.1–0.9)
MONOCYTES NFR BLD AUTO: 6 % (ref 5–12)
NEUTROPHILS NFR BLD AUTO: 67 % (ref 42.7–76)
NEUTROPHILS NFR BLD AUTO: 7.31 10*3/MM3 (ref 1.7–7)
NRBC BLD AUTO-RTO: 0 /100 WBC (ref 0–0.2)
PLATELET # BLD AUTO: 335 10*3/MM3 (ref 140–450)
PMV BLD AUTO: 9.4 FL (ref 6–12)
RBC # BLD AUTO: 5.28 10*6/MM3 (ref 3.77–5.28)
TIBC SERPL-MCNC: 381 MCG/DL (ref 298–536)
TRANSFERRIN SERPL-MCNC: 256 MG/DL (ref 200–360)
WBC NRBC COR # BLD: 10.9 10*3/MM3 (ref 3.4–10.8)

## 2023-11-09 PROCEDURE — 1126F AMNT PAIN NOTED NONE PRSNT: CPT | Performed by: INTERNAL MEDICINE

## 2023-11-09 PROCEDURE — 82668 ASSAY OF ERYTHROPOIETIN: CPT | Performed by: INTERNAL MEDICINE

## 2023-11-09 PROCEDURE — 84466 ASSAY OF TRANSFERRIN: CPT | Performed by: INTERNAL MEDICINE

## 2023-11-09 PROCEDURE — 99214 OFFICE O/P EST MOD 30 MIN: CPT | Performed by: INTERNAL MEDICINE

## 2023-11-09 PROCEDURE — 1160F RVW MEDS BY RX/DR IN RCRD: CPT | Performed by: INTERNAL MEDICINE

## 2023-11-09 PROCEDURE — 86140 C-REACTIVE PROTEIN: CPT | Performed by: INTERNAL MEDICINE

## 2023-11-09 PROCEDURE — 83540 ASSAY OF IRON: CPT | Performed by: INTERNAL MEDICINE

## 2023-11-09 PROCEDURE — 1159F MED LIST DOCD IN RCRD: CPT | Performed by: INTERNAL MEDICINE

## 2023-11-09 PROCEDURE — 85025 COMPLETE CBC W/AUTO DIFF WBC: CPT | Performed by: INTERNAL MEDICINE

## 2023-11-09 NOTE — PROGRESS NOTES
Venipuncture Blood Specimen Collection  Venipuncture performed in left arm  by Rosina Phillips MA with good hemostasis. Patient tolerated the procedure well without complications.   11/09/23   Rosina Phillips MA

## 2023-11-10 LAB — EPO SERPL-ACNC: 7.5 MIU/ML (ref 2.6–18.5)

## 2024-03-07 ENCOUNTER — LAB (OUTPATIENT)
Dept: LAB | Facility: HOSPITAL | Age: 44
End: 2024-03-07
Payer: COMMERCIAL

## 2024-03-07 ENCOUNTER — TRANSCRIBE ORDERS (OUTPATIENT)
Dept: ADMINISTRATIVE | Facility: HOSPITAL | Age: 44
End: 2024-03-07
Payer: COMMERCIAL

## 2024-03-07 DIAGNOSIS — N18.9 CHRONIC KIDNEY DISEASE, UNSPECIFIED CKD STAGE: ICD-10-CM

## 2024-03-07 DIAGNOSIS — N18.9 CHRONIC KIDNEY DISEASE, UNSPECIFIED CKD STAGE: Primary | ICD-10-CM

## 2024-03-07 LAB
ALBUMIN SERPL-MCNC: 3.9 G/DL (ref 3.5–5.2)
ALBUMIN/GLOB SERPL: 1.4 G/DL
ALP SERPL-CCNC: 80 U/L (ref 39–117)
ALT SERPL W P-5'-P-CCNC: 28 U/L (ref 1–33)
ANION GAP SERPL CALCULATED.3IONS-SCNC: 13.4 MMOL/L (ref 5–15)
AST SERPL-CCNC: 25 U/L (ref 1–32)
BACTERIA UR QL AUTO: ABNORMAL /HPF
BILIRUB SERPL-MCNC: 0.7 MG/DL (ref 0–1.2)
BILIRUB UR QL STRIP: NEGATIVE
BILIRUB UR QL STRIP: NEGATIVE
BUN SERPL-MCNC: 10 MG/DL (ref 6–20)
BUN/CREAT SERPL: 10.3 (ref 7–25)
CALCIUM SPEC-SCNC: 9.3 MG/DL (ref 8.6–10.5)
CHLORIDE SERPL-SCNC: 106 MMOL/L (ref 98–107)
CLARITY UR: ABNORMAL
CLARITY UR: ABNORMAL
CO2 SERPL-SCNC: 21.6 MMOL/L (ref 22–29)
COLOR UR: YELLOW
COLOR UR: YELLOW
CREAT SERPL-MCNC: 0.97 MG/DL (ref 0.57–1)
CREAT UR-MCNC: 130.4 MG/DL
EGFRCR SERPLBLD CKD-EPI 2021: 73.6 ML/MIN/1.73
GLOBULIN UR ELPH-MCNC: 2.7 GM/DL
GLUCOSE SERPL-MCNC: 68 MG/DL (ref 65–99)
GLUCOSE UR STRIP-MCNC: NEGATIVE MG/DL
GLUCOSE UR STRIP-MCNC: NEGATIVE MG/DL
HGB UR QL STRIP.AUTO: ABNORMAL
HGB UR QL STRIP.AUTO: ABNORMAL
HOLD SPECIMEN: NORMAL
HYALINE CASTS UR QL AUTO: ABNORMAL /LPF
KETONES UR QL STRIP: NEGATIVE
KETONES UR QL STRIP: NEGATIVE
LEUKOCYTE ESTERASE UR QL STRIP.AUTO: ABNORMAL
LEUKOCYTE ESTERASE UR QL STRIP.AUTO: ABNORMAL
NITRITE UR QL STRIP: POSITIVE
NITRITE UR QL STRIP: POSITIVE
PH UR STRIP.AUTO: 5.5 [PH] (ref 5–8)
PH UR STRIP.AUTO: 5.5 [PH] (ref 5–8)
POTASSIUM SERPL-SCNC: 4.4 MMOL/L (ref 3.5–5.2)
PROT ?TM UR-MCNC: 7.4 MG/DL
PROT SERPL-MCNC: 6.6 G/DL (ref 6–8.5)
PROT UR QL STRIP: NEGATIVE
PROT UR QL STRIP: NEGATIVE
PROT/CREAT UR: 56.7 MG/G CREA (ref 0–200)
RBC # UR STRIP: ABNORMAL /HPF
REF LAB TEST METHOD: ABNORMAL
SODIUM SERPL-SCNC: 141 MMOL/L (ref 136–145)
SP GR UR STRIP: 1.02 (ref 1–1.03)
SP GR UR STRIP: 1.02 (ref 1–1.03)
SQUAMOUS #/AREA URNS HPF: ABNORMAL /HPF
UROBILINOGEN UR QL STRIP: ABNORMAL
UROBILINOGEN UR QL STRIP: ABNORMAL
WBC # UR STRIP: ABNORMAL /HPF

## 2024-03-07 PROCEDURE — 36415 COLL VENOUS BLD VENIPUNCTURE: CPT

## 2024-03-07 PROCEDURE — 87077 CULTURE AEROBIC IDENTIFY: CPT

## 2024-03-07 PROCEDURE — 82570 ASSAY OF URINE CREATININE: CPT

## 2024-03-07 PROCEDURE — 87186 SC STD MICRODIL/AGAR DIL: CPT

## 2024-03-07 PROCEDURE — 84156 ASSAY OF PROTEIN URINE: CPT

## 2024-03-07 PROCEDURE — 87086 URINE CULTURE/COLONY COUNT: CPT

## 2024-03-07 PROCEDURE — 81001 URINALYSIS AUTO W/SCOPE: CPT

## 2024-03-07 PROCEDURE — 80053 COMPREHEN METABOLIC PANEL: CPT

## 2024-03-10 LAB — BACTERIA SPEC AEROBE CULT: ABNORMAL

## 2024-09-27 ENCOUNTER — TRANSCRIBE ORDERS (OUTPATIENT)
Dept: ADMINISTRATIVE | Facility: HOSPITAL | Age: 44
End: 2024-09-27
Payer: COMMERCIAL

## 2024-09-27 ENCOUNTER — LAB (OUTPATIENT)
Dept: LAB | Facility: HOSPITAL | Age: 44
End: 2024-09-27
Payer: COMMERCIAL

## 2024-09-27 DIAGNOSIS — N18.9 CHRONIC KIDNEY DISEASE, UNSPECIFIED CKD STAGE: ICD-10-CM

## 2024-09-27 DIAGNOSIS — N18.9 CHRONIC KIDNEY DISEASE, UNSPECIFIED CKD STAGE: Primary | ICD-10-CM

## 2024-09-27 PROCEDURE — 84156 ASSAY OF PROTEIN URINE: CPT

## 2024-09-27 PROCEDURE — 82570 ASSAY OF URINE CREATININE: CPT

## 2024-09-27 PROCEDURE — 81001 URINALYSIS AUTO W/SCOPE: CPT

## 2024-09-27 PROCEDURE — 82043 UR ALBUMIN QUANTITATIVE: CPT

## 2024-09-27 PROCEDURE — 80053 COMPREHEN METABOLIC PANEL: CPT

## 2024-09-27 PROCEDURE — 36415 COLL VENOUS BLD VENIPUNCTURE: CPT

## 2024-09-28 LAB
ALBUMIN SERPL-MCNC: 4.2 G/DL (ref 3.5–5.2)
ALBUMIN UR-MCNC: <1.2 MG/DL
ALBUMIN/GLOB SERPL: 1.8 G/DL
ALP SERPL-CCNC: 80 U/L (ref 39–117)
ALT SERPL W P-5'-P-CCNC: 18 U/L (ref 1–33)
ANION GAP SERPL CALCULATED.3IONS-SCNC: 12 MMOL/L (ref 5–15)
AST SERPL-CCNC: 15 U/L (ref 1–32)
BACTERIA UR QL AUTO: ABNORMAL /HPF
BILIRUB SERPL-MCNC: 0.8 MG/DL (ref 0–1.2)
BILIRUB UR QL STRIP: NEGATIVE
BUN SERPL-MCNC: 8 MG/DL (ref 6–20)
BUN/CREAT SERPL: 7.8 (ref 7–25)
CALCIUM SPEC-SCNC: 9.5 MG/DL (ref 8.6–10.5)
CHLORIDE SERPL-SCNC: 105 MMOL/L (ref 98–107)
CLARITY UR: ABNORMAL
CO2 SERPL-SCNC: 24 MMOL/L (ref 22–29)
COLOR UR: YELLOW
CREAT SERPL-MCNC: 1.03 MG/DL (ref 0.57–1)
CREAT UR-MCNC: 98.1 MG/DL
CREAT UR-MCNC: 98.1 MG/DL
EGFRCR SERPLBLD CKD-EPI 2021: 68.1 ML/MIN/1.73
GLOBULIN UR ELPH-MCNC: 2.4 GM/DL
GLUCOSE SERPL-MCNC: 76 MG/DL (ref 65–99)
GLUCOSE UR STRIP-MCNC: NEGATIVE MG/DL
HGB UR QL STRIP.AUTO: NEGATIVE
HYALINE CASTS UR QL AUTO: ABNORMAL /LPF
KETONES UR QL STRIP: NEGATIVE
LEUKOCYTE ESTERASE UR QL STRIP.AUTO: ABNORMAL
MICROALBUMIN/CREAT UR: NORMAL MG/G{CREAT}
NITRITE UR QL STRIP: POSITIVE
PH UR STRIP.AUTO: 6 [PH] (ref 5–8)
POTASSIUM SERPL-SCNC: 4.2 MMOL/L (ref 3.5–5.2)
PROT ?TM UR-MCNC: 6.7 MG/DL
PROT SERPL-MCNC: 6.6 G/DL (ref 6–8.5)
PROT UR QL STRIP: NEGATIVE
PROT/CREAT UR: 68.3 MG/G CREA (ref 0–200)
RBC # UR STRIP: ABNORMAL /HPF
REF LAB TEST METHOD: ABNORMAL
SODIUM SERPL-SCNC: 141 MMOL/L (ref 136–145)
SP GR UR STRIP: 1.01 (ref 1–1.03)
SQUAMOUS #/AREA URNS HPF: ABNORMAL /HPF
UROBILINOGEN UR QL STRIP: ABNORMAL
WBC # UR STRIP: ABNORMAL /HPF

## 2025-04-07 ENCOUNTER — LAB (OUTPATIENT)
Dept: LAB | Facility: HOSPITAL | Age: 45
End: 2025-04-07
Payer: COMMERCIAL

## 2025-04-07 ENCOUNTER — TRANSCRIBE ORDERS (OUTPATIENT)
Dept: ADMINISTRATIVE | Facility: HOSPITAL | Age: 45
End: 2025-04-07
Payer: COMMERCIAL

## 2025-04-07 DIAGNOSIS — N18.9 CHRONIC KIDNEY DISEASE, UNSPECIFIED CKD STAGE: ICD-10-CM

## 2025-04-07 DIAGNOSIS — N18.9 CHRONIC KIDNEY DISEASE, UNSPECIFIED CKD STAGE: Primary | ICD-10-CM

## 2025-04-07 LAB
ALBUMIN SERPL-MCNC: 4.1 G/DL (ref 3.5–5.2)
ALBUMIN UR-MCNC: <1.2 MG/DL
ALBUMIN/GLOB SERPL: 1.6 G/DL
ALP SERPL-CCNC: 76 U/L (ref 39–117)
ALT SERPL W P-5'-P-CCNC: 15 U/L (ref 1–33)
ANION GAP SERPL CALCULATED.3IONS-SCNC: 11.3 MMOL/L (ref 5–15)
AST SERPL-CCNC: 17 U/L (ref 1–32)
BACTERIA UR QL AUTO: ABNORMAL /HPF
BILIRUB SERPL-MCNC: 0.7 MG/DL (ref 0–1.2)
BILIRUB UR QL STRIP: NEGATIVE
BUN SERPL-MCNC: 10 MG/DL (ref 6–20)
BUN/CREAT SERPL: 9.3 (ref 7–25)
CALCIUM SPEC-SCNC: 8.9 MG/DL (ref 8.6–10.5)
CHLORIDE SERPL-SCNC: 103 MMOL/L (ref 98–107)
CLARITY UR: ABNORMAL
CO2 SERPL-SCNC: 23.7 MMOL/L (ref 22–29)
COLOR UR: YELLOW
CREAT SERPL-MCNC: 1.08 MG/DL (ref 0.57–1)
CREAT UR-MCNC: 68.6 MG/DL
CREAT UR-MCNC: 68.6 MG/DL
EGFRCR SERPLBLD CKD-EPI 2021: 63.9 ML/MIN/1.73
GLOBULIN UR ELPH-MCNC: 2.5 GM/DL
GLUCOSE SERPL-MCNC: 85 MG/DL (ref 65–99)
GLUCOSE UR STRIP-MCNC: NEGATIVE MG/DL
HGB UR QL STRIP.AUTO: NEGATIVE
HOLD SPECIMEN: NORMAL
HYALINE CASTS UR QL AUTO: ABNORMAL /LPF
KETONES UR QL STRIP: NEGATIVE
LEUKOCYTE ESTERASE UR QL STRIP.AUTO: ABNORMAL
MICROALBUMIN/CREAT UR: NORMAL MG/G{CREAT}
NITRITE UR QL STRIP: POSITIVE
PH UR STRIP.AUTO: 5.5 [PH] (ref 5–8)
POTASSIUM SERPL-SCNC: 4 MMOL/L (ref 3.5–5.2)
PROT ?TM UR-MCNC: 4.8 MG/DL
PROT SERPL-MCNC: 6.6 G/DL (ref 6–8.5)
PROT UR QL STRIP: NEGATIVE
PROT/CREAT UR: 70 MG/G CREA (ref 0–200)
RBC # UR STRIP: ABNORMAL /HPF
REF LAB TEST METHOD: ABNORMAL
SODIUM SERPL-SCNC: 138 MMOL/L (ref 136–145)
SP GR UR STRIP: 1.01 (ref 1–1.03)
SQUAMOUS #/AREA URNS HPF: ABNORMAL /HPF
UROBILINOGEN UR QL STRIP: ABNORMAL
WBC # UR STRIP: ABNORMAL /HPF

## 2025-04-07 PROCEDURE — 82570 ASSAY OF URINE CREATININE: CPT

## 2025-04-07 PROCEDURE — 84156 ASSAY OF PROTEIN URINE: CPT

## 2025-04-07 PROCEDURE — 80053 COMPREHEN METABOLIC PANEL: CPT

## 2025-04-07 PROCEDURE — 36415 COLL VENOUS BLD VENIPUNCTURE: CPT

## 2025-04-07 PROCEDURE — 87086 URINE CULTURE/COLONY COUNT: CPT

## 2025-04-07 PROCEDURE — 82043 UR ALBUMIN QUANTITATIVE: CPT

## 2025-04-07 PROCEDURE — 81001 URINALYSIS AUTO W/SCOPE: CPT

## 2025-04-07 PROCEDURE — 87088 URINE BACTERIA CULTURE: CPT

## 2025-04-07 PROCEDURE — 87186 SC STD MICRODIL/AGAR DIL: CPT

## 2025-04-11 LAB — BACTERIA SPEC AEROBE CULT: ABNORMAL
